# Patient Record
Sex: FEMALE | Race: WHITE | NOT HISPANIC OR LATINO | Employment: FULL TIME | URBAN - NONMETROPOLITAN AREA
[De-identification: names, ages, dates, MRNs, and addresses within clinical notes are randomized per-mention and may not be internally consistent; named-entity substitution may affect disease eponyms.]

---

## 2018-04-10 ENCOUNTER — OFFICE VISIT (OUTPATIENT)
Dept: MEDICAL GROUP | Facility: PHYSICIAN GROUP | Age: 58
End: 2018-04-10
Payer: COMMERCIAL

## 2018-04-10 VITALS
WEIGHT: 151 LBS | OXYGEN SATURATION: 97 % | HEIGHT: 68 IN | BODY MASS INDEX: 22.88 KG/M2 | DIASTOLIC BLOOD PRESSURE: 72 MMHG | TEMPERATURE: 98 F | HEART RATE: 84 BPM | RESPIRATION RATE: 16 BRPM | SYSTOLIC BLOOD PRESSURE: 126 MMHG

## 2018-04-10 DIAGNOSIS — R42 DIZZINESS: ICD-10-CM

## 2018-04-10 DIAGNOSIS — R51.9 INCREASED FREQUENCY OF HEADACHES: ICD-10-CM

## 2018-04-10 DIAGNOSIS — Z12.39 SCREENING FOR BREAST CANCER: ICD-10-CM

## 2018-04-10 DIAGNOSIS — Z13.6 SCREENING FOR CARDIOVASCULAR CONDITION: ICD-10-CM

## 2018-04-10 DIAGNOSIS — Z00.00 HEALTHCARE MAINTENANCE: ICD-10-CM

## 2018-04-10 PROCEDURE — 99204 OFFICE O/P NEW MOD 45 MIN: CPT | Performed by: NURSE PRACTITIONER

## 2018-04-10 RX ORDER — BIOTIN 1 MG
1000 TABLET ORAL DAILY
COMMUNITY

## 2018-04-10 RX ORDER — MECLIZINE HCL 12.5 MG/1
12.5 TABLET ORAL 3 TIMES DAILY PRN
Qty: 30 TAB | Refills: 0 | Status: SHIPPED | OUTPATIENT
Start: 2018-04-10 | End: 2018-12-18

## 2018-04-10 RX ORDER — CHLORAL HYDRATE 500 MG
1000 CAPSULE ORAL DAILY
COMMUNITY

## 2018-04-10 RX ORDER — NAPROXEN 500 MG/1
500 TABLET ORAL 2 TIMES DAILY WITH MEALS
Qty: 60 TAB | Refills: 1 | Status: SHIPPED | OUTPATIENT
Start: 2018-04-10 | End: 2018-12-18

## 2018-04-10 ASSESSMENT — PATIENT HEALTH QUESTIONNAIRE - PHQ9: CLINICAL INTERPRETATION OF PHQ2 SCORE: 0

## 2018-04-10 NOTE — PROGRESS NOTES
Chief Complaint   Patient presents with   • Headache     mood swings   • Loss Of Balance         This is a 58 y.o.female patient that presents today with the following: Establish care with new PCP, discuss acute and chronic conditions    Increased frequency of headaches  Pt has increased frequency of headaches over the past 3-4 weeks. She cannot think of any other associated symptoms. She does not have photophobia but does have occasional photophobia. She has not started any new medications. She does have allergies and wonders if it could possibly be worsening allergic rhinitis. She also reports to me that she has not had her eyes checked in over 3 years, recommended that she go in for an eye exam. Upon physical examination it is noted that she does have a discoloration and retraction of her left TM and erythematous and edematous nasal mucosa. She has been taking over-the-counter Advil with no significant improvement in her headaches. We'll have her try naproxen, 500 mg twice daily with food. I have advised her to use nasal saline 2 sprays to each nostril 3-5 times a day as needed for nasal congestion and rinsing and we'll have her add second-generation antihistamine such as Allegra, Zyrtec or Xyzal to her regimen. Symptoms do not improve we will look into further evaluation. I have asked her to keep a headache diary for the next few weeks.     Dizziness  This is chronic, for the last couple of year and seemed to have worsened over the last 6 months. It is pretty much constant now. It is especially around tall ceilings or buildings. She has been diagnosed with vertigo in the past due to her allergies but feels this is different. She does not have any other associated neurological symptoms with her dizziness. She does have a discolored and retracted left TM and does suffer from allergic rhinitis. She has been on meclizine in the past which does help with her dizziness, we will have her restart this  medication.    Healthcare maintenance  Patient is due for routine fasting labs, these have been ordered. She is also due for mammogram. She states that she is up-to-date with colon cancer screening, we will try to get her outside record and update her health maintenance record. She no longer needs Pap smears as she has had hysterectomy for noncancerous reasons.      No results found for any previous visit.         clinical course has been stable    No past medical history on file.    No past surgical history on file.    No family history on file.    Patient has no known allergies.    Current Outpatient Prescriptions Ordered in [facility]   Medication Sig Dispense Refill   • Omega-3 Fatty Acids (FISH OIL) 1000 MG Cap capsule Take 1,000 mg by mouth 3 times a day, with meals.     • Biotin 1000 MCG Tab Take  by mouth.     • vitamin D (CHOLECALCIFEROL) 1000 UNIT Tab Take 1,000 Units by mouth every day.     • Misc Natural Products (ESTROVEN ENERGY PO) Take  by mouth.     • Calcium-Magnesium-Zinc 167-83-8 MG Tab Take  by mouth.     • meclizine (ANTIVERT) 12.5 MG Tab Take 1 Tab by mouth 3 times a day as needed. 30 Tab 0   • naproxen (NAPROSYN) 500 MG Tab Take 1 Tab by mouth 2 times a day, with meals. 60 Tab 1     No current Epic-ordered facility-administered medications on file.        Constitutional ROS: No unexpected change in weight, No weakness, No unexplained fevers, sweats, or chills  Ear ROS: Positive per history of present illness  Pulmonary ROS: No chronic cough, sputum, or hemoptysis, No shortness of breath, No recent change in breathing  Cardiovascular ROS: No chest pain, No edema, No palpitations  Gastrointestinal ROS: No abdominal pain, No nausea, vomiting, diarrhea, or constipation  Musculoskeletal/Extremities ROS: No clubbing, No peripheral edema, No pain, redness or swelling on the joints  Neurologic ROS: Normal development, No seizures, No weakness, Positive for headaches and dizziness, per history of present  "illness    Physical exam:  /72   Pulse 84   Temp 36.7 °C (98 °F)   Resp 16   Ht 1.715 m (5' 7.5\")   Wt 68.5 kg (151 lb)   SpO2 97%   BMI 23.30 kg/m²   General Appearance: Middle-aged female, alert, no distress, well-nourished, well-groomed  Skin: Skin color, texture, turgor normal. No rashes or lesions.  Eyes: conjunctivae/corneas clear. PERRL, EOM's intact. Fundi benign  Ears: positive findings: R TM -normal, L TM - retracted and discolored  Lungs: negative findings: normal respiratory rate and rhythm, lungs clear to auscultation  Heart: negative. RRR without murmur, gallop, or rubs.  No ectopy.  Abdomen: Abdomen soft, non-tender. BS normal. No masses,  No organomegaly  Musculoskeletal: negative findings: ROM of all joints is normal, no evidence of joint instability, strength normal, no deformities present  Neurologic: intact, oriented, mood appropriate, judgment intact. Cranial nerves II through XII grossly intact    Medical decision making/discussion: Patient is going to start keeping a headache diary and will start naproxen 500 mg twice daily with meals. We'll have her start meclizine 12.5 mg one pill 2 times a day as needed for dizziness. She will have labs done before she follows up with me in 4-6 weeks. Her mammogram has been ordered.    Mariela was seen today for headache and loss of balance.    Diagnoses and all orders for this visit:    Increased frequency of headaches  -     naproxen (NAPROSYN) 500 MG Tab; Take 1 Tab by mouth 2 times a day, with meals.    Dizziness  -     meclizine (ANTIVERT) 12.5 MG Tab; Take 1 Tab by mouth 3 times a day as needed.    Healthcare maintenance    Screening for breast cancer  -     MA-SCREEN MAMMO W/CAD-BILAT; Future    Screening for cardiovascular condition  -     COMP METABOLIC PANEL; Future  -     LIPID PROFILE; Future          Please note that this dictation was created using voice recognition software. I have made every reasonable attempt to correct obvious " errors, but I expect that there are errors of grammar and possibly content that I did not discover before finalizing the note.

## 2018-04-10 NOTE — ASSESSMENT & PLAN NOTE
Pt has increased frequency of headaches over the past 3-4 weeks. She cannot think of any other associated symptoms. She does not have photophobia but does have occasional photophobia. She has not started any new medications. She does have allergies and wonders if it could possibly be worsening allergic rhinitis. She also reports to me that she has not had her eyes checked in over 3 years, recommended that she go in for an eye exam. Upon physical examination it is noted that she does have a discoloration and retraction of her left TM and erythematous and edematous nasal mucosa. She has been taking over-the-counter Advil with no significant improvement in her headaches. We'll have her try naproxen, 500 mg twice daily with food. I have advised her to use nasal saline 2 sprays to each nostril 3-5 times a day as needed for nasal congestion and rinsing and we'll have her add second-generation antihistamine such as Allegra, Zyrtec or Xyzal to her regimen. Symptoms do not improve we will look into further evaluation. I have asked her to keep a headache diary for the next few weeks.

## 2018-04-10 NOTE — ASSESSMENT & PLAN NOTE
This is chronic, for the last couple of year and seemed to have worsened over the last 6 months. It is pretty much constant now. It is especially around tall ceilings or buildings. She has been diagnosed with vertigo in the past due to her allergies but feels this is different. She does not have any other associated neurological symptoms with her dizziness. She does have a discolored and retracted left TM and does suffer from allergic rhinitis. She has been on meclizine in the past which does help with her dizziness, we will have her restart this medication.

## 2018-04-10 NOTE — ASSESSMENT & PLAN NOTE
Patient is due for routine fasting labs, these have been ordered. She is also due for mammogram. She states that she is up-to-date with colon cancer screening, we will try to get her outside record and update her health maintenance record. She no longer needs Pap smears as she has had hysterectomy for noncancerous reasons.

## 2018-04-10 NOTE — PATIENT INSTRUCTIONS
Use nasal saline, be generous, 2 sprays to each nostril 3-5 times per day. Add Allegra, Zyrtec or Xyzal OTC, ok to use the store brand    Meclizine 12.5 1-2 tabs up to 3 times daily for dizziness    Naproxen for headaches, twice daily with food, do not take advil at the same time    Follow up in 4-6 weeks with labs before visit    Mammogram

## 2018-04-16 ENCOUNTER — HOSPITAL ENCOUNTER (OUTPATIENT)
Dept: LAB | Facility: MEDICAL CENTER | Age: 58
End: 2018-04-16
Attending: NURSE PRACTITIONER
Payer: COMMERCIAL

## 2018-04-16 DIAGNOSIS — Z13.6 SCREENING FOR CARDIOVASCULAR CONDITION: ICD-10-CM

## 2018-04-16 LAB
ALBUMIN SERPL BCP-MCNC: 4.2 G/DL (ref 3.2–4.9)
ALBUMIN/GLOB SERPL: 1.6 G/DL
ALP SERPL-CCNC: 58 U/L (ref 30–99)
ALT SERPL-CCNC: 14 U/L (ref 2–50)
ANION GAP SERPL CALC-SCNC: 8 MMOL/L (ref 0–11.9)
AST SERPL-CCNC: 15 U/L (ref 12–45)
BILIRUB SERPL-MCNC: 0.9 MG/DL (ref 0.1–1.5)
BUN SERPL-MCNC: 13 MG/DL (ref 8–22)
CALCIUM SERPL-MCNC: 9.3 MG/DL (ref 8.5–10.5)
CHLORIDE SERPL-SCNC: 103 MMOL/L (ref 96–112)
CHOLEST SERPL-MCNC: 256 MG/DL (ref 100–199)
CO2 SERPL-SCNC: 29 MMOL/L (ref 20–33)
CREAT SERPL-MCNC: 0.83 MG/DL (ref 0.5–1.4)
GLOBULIN SER CALC-MCNC: 2.7 G/DL (ref 1.9–3.5)
GLUCOSE SERPL-MCNC: 86 MG/DL (ref 65–99)
HDLC SERPL-MCNC: 69 MG/DL
LDLC SERPL CALC-MCNC: 151 MG/DL
POTASSIUM SERPL-SCNC: 4.4 MMOL/L (ref 3.6–5.5)
PROT SERPL-MCNC: 6.9 G/DL (ref 6–8.2)
SODIUM SERPL-SCNC: 140 MMOL/L (ref 135–145)
TRIGL SERPL-MCNC: 178 MG/DL (ref 0–149)

## 2018-04-16 PROCEDURE — 80053 COMPREHEN METABOLIC PANEL: CPT

## 2018-04-16 PROCEDURE — 36415 COLL VENOUS BLD VENIPUNCTURE: CPT

## 2018-04-16 PROCEDURE — 80061 LIPID PANEL: CPT

## 2018-05-16 ENCOUNTER — OFFICE VISIT (OUTPATIENT)
Dept: MEDICAL GROUP | Facility: PHYSICIAN GROUP | Age: 58
End: 2018-05-16
Payer: COMMERCIAL

## 2018-05-16 VITALS
DIASTOLIC BLOOD PRESSURE: 70 MMHG | HEART RATE: 80 BPM | RESPIRATION RATE: 16 BRPM | OXYGEN SATURATION: 96 % | WEIGHT: 148 LBS | HEIGHT: 68 IN | BODY MASS INDEX: 22.43 KG/M2 | SYSTOLIC BLOOD PRESSURE: 130 MMHG | TEMPERATURE: 97.6 F

## 2018-05-16 DIAGNOSIS — R68.82 LOW LIBIDO: ICD-10-CM

## 2018-05-16 DIAGNOSIS — R51.9 INCREASED FREQUENCY OF HEADACHES: ICD-10-CM

## 2018-05-16 DIAGNOSIS — E78.2 MIXED HYPERLIPIDEMIA: ICD-10-CM

## 2018-05-16 DIAGNOSIS — N95.1 HOT FLASHES DUE TO MENOPAUSE: ICD-10-CM

## 2018-05-16 DIAGNOSIS — R42 DIZZINESS: ICD-10-CM

## 2018-05-16 PROCEDURE — 99214 OFFICE O/P EST MOD 30 MIN: CPT | Performed by: NURSE PRACTITIONER

## 2018-05-16 RX ORDER — ESTRADIOL 1 MG/1
1 TABLET ORAL DAILY
Qty: 90 TAB | Refills: 1 | Status: SHIPPED | OUTPATIENT
Start: 2018-05-16 | End: 2018-12-18 | Stop reason: SDUPTHER

## 2018-05-16 NOTE — PATIENT INSTRUCTIONS
Can try cutting the Naproxen in half    Can try switch the Zyrtec to Allegra or Claritin    Estrace 1 mg daily    Follow up with me in 6 months, sooner if needed    Schedule mammogram    Fat and Cholesterol Restricted Diet  High levels of fat and cholesterol in your blood may lead to various health problems, such as diseases of the heart, blood vessels, gallbladder, liver, and pancreas. Fats are concentrated sources of energy that come in various forms. Certain types of fat, including saturated fat, may be harmful in excess. Cholesterol is a substance needed by your body in small amounts. Your body makes all the cholesterol it needs. Excess cholesterol comes from the food you eat.  When you have high levels of cholesterol and saturated fat in your blood, health problems can develop because the excess fat and cholesterol will gather along the walls of your blood vessels, causing them to narrow. Choosing the right foods will help you control your intake of fat and cholesterol. This will help keep the levels of these substances in your blood within normal limits and reduce your risk of disease.  WHAT IS MY PLAN?  Your health care provider recommends that you:  · Get no more than __________ % of the total calories in your daily diet from fat.  · Limit your intake of saturated fat to less than ______% of your total calories each day.  · Limit the amount of cholesterol in your diet to less than _________mg per day.  WHAT TYPES OF FAT SHOULD I CHOOSE?  · Choose healthy fats more often. Choose monounsaturated and polyunsaturated fats, such as olive and canola oil, flaxseeds, walnuts, almonds, and seeds.  · Eat more omega-3 fats. Good choices include salmon, mackerel, sardines, tuna, flaxseed oil, and ground flaxseeds. Aim to eat fish at least two times a week.  · Limit saturated fats. Saturated fats are primarily found in animal products, such as meats, butter, and cream. Plant sources of saturated fats include palm oil,  "palm kernel oil, and coconut oil.  · Avoid foods with partially hydrogenated oils in them. These contain trans fats. Examples of foods that contain trans fats are stick margarine, some tub margarines, cookies, crackers, and other baked goods.  WHAT GENERAL GUIDELINES DO I NEED TO FOLLOW?  These guidelines for healthy eating will help you control your intake of fat and cholesterol:  · Check food labels carefully to identify foods with trans fats or high amounts of saturated fat.  · Fill one half of your plate with vegetables and green salads.  · Fill one fourth of your plate with whole grains. Look for the word \"whole\" as the first word in the ingredient list.  · Fill one fourth of your plate with lean protein foods.  · Limit fruit to two servings a day. Choose fruit instead of juice.  · Eat more foods that contain soluble fiber. Examples of foods that contain this type of fiber are apples, broccoli, carrots, beans, peas, and barley. Aim to get 20-30 g of fiber per day.  · Eat more home-cooked food and less restaurant, buffet, and fast food.  · Limit or avoid alcohol.  · Limit foods high in starch and sugar.  · Limit fried foods.  · Cook foods using methods other than frying. Baking, boiling, grilling, and broiling are all great options.  · Lose weight if you are overweight. Losing just 5-10% of your initial body weight can help your overall health and prevent diseases such as diabetes and heart disease.  WHAT FOODS CAN I EAT?  Grains  Whole grains, such as whole wheat or whole grain breads, crackers, cereals, and pasta. Unsweetened oatmeal, bulgur, barley, quinoa, or brown rice. Corn or whole wheat flour tortillas.  Vegetables  Fresh or frozen vegetables (raw, steamed, roasted, or grilled). Green salads.  Fruits  All fresh, canned (in natural juice), or frozen fruits.  Meat and Other Protein Products  Ground beef (85% or leaner), grass-fed beef, or beef trimmed of fat. Skinless chicken or turkey. Ground chicken or " turkey. Pork trimmed of fat. All fish and seafood. Eggs. Dried beans, peas, or lentils. Unsalted nuts or seeds. Unsalted canned or dry beans.  Dairy  Low-fat dairy products, such as skim or 1% milk, 2% or reduced-fat cheeses, low-fat ricotta or cottage cheese, or plain low-fat yogurt.  Fats and Oils  Tub margarines without trans fats. Light or reduced-fat mayonnaise and salad dressings. Avocado. Olive, canola, sesame, or safflower oils. Natural peanut or almond butter (choose ones without added sugar and oil).  The items listed above may not be a complete list of recommended foods or beverages. Contact your dietitian for more options.  WHAT FOODS ARE NOT RECOMMENDED?  Grains  White bread. White pasta. White rice. Cornbread. Bagels, pastries, and croissants. Crackers that contain trans fat.  Vegetables  White potatoes. Corn. Creamed or fried vegetables. Vegetables in a cheese sauce.  Fruits  Dried fruits. Canned fruit in light or heavy syrup. Fruit juice.  Meat and Other Protein Products  Fatty cuts of meat. Ribs, chicken wings, jimenes, sausage, bologna, salami, chitterlings, fatback, hot dogs, bratwurst, and packaged luncheon meats. Liver and organ meats.  Dairy  Whole or 2% milk, cream, half-and-half, and cream cheese. Whole milk cheeses. Whole-fat or sweetened yogurt. Full-fat cheeses. Nondairy creamers and whipped toppings. Processed cheese, cheese spreads, or cheese curds.  Sweets and Desserts  Corn syrup, sugars, honey, and molasses. Candy. Jam and jelly. Syrup. Sweetened cereals. Cookies, pies, cakes, donuts, muffins, and ice cream.  Fats and Oils  Butter, stick margarine, lard, shortening, ghee, or jimenes fat. Coconut, palm kernel, or palm oils.  Beverages  Alcohol. Sweetened drinks (such as sodas, lemonade, and fruit drinks or punches).  The items listed above may not be a complete list of foods and beverages to avoid. Contact your dietitian for more information.     This information is not intended to replace  advice given to you by your health care provider. Make sure you discuss any questions you have with your health care provider.     Document Released: 12/18/2006 Document Revised: 01/08/2016 Document Reviewed: 03/18/2015  Elsevier Interactive Patient Education ©2016 [employer]

## 2018-05-17 NOTE — PROGRESS NOTES
Chief Complaint   Patient presents with   • Dizziness     fv labs         This is a 58 y.o.female patient that presents today with the following: Follow-up, review labs    Increased frequency of headaches  Patient here for follow-up on increased frequency of headaches over the past 4-5 months.  She was started on naproxen, while this helped her headache it did cause sleepiness for her.  She reports being very sensitive to all medications.  She will try breaking this in half and take only as needed and at bedtime.  She also reports the allergy treatment helped her headaches as well.    Dizziness  Patient reports that this has improved since her last visit especially with starting allergy medication, however the Zyrtec did make her sleepy.  We will have her switch to 1 of the other second generation antihistamines such as Allegra or Claritin.  She can also continue to take these at night.    Mixed hyperlipidemia  This is a chronic condition, stable.  Her lipid profile is as follows:  Component      Latest Ref Rng & Units 4/16/2018           9:04 AM   Cholesterol,Tot      100 - 199 mg/dL 256 (H)   Triglycerides      0 - 149 mg/dL 178 (H)   HDL      >=40 mg/dL 69   LDL      <100 mg/dL 151 (H)   Her calculated 10 year risk of having cardiovascular event in the next 10 years is 2.9%.  According to current guidelines per the AHA/ACC she is not a candidate for aspirin or statin therapy, but I did recommend that she follow a healthy low-fat, low-cholesterol diet, exercise regularly continue to maintain her healthy weight.  She was given printed educational material on low-fat, low-cholesterol diet.  We will recheck this again in 6 months.    Low libido  Patient reports to suffering from low libido/sexual drive as well as hot flashes and other symptoms of menopause for the last several months.  5 years ago this was not a problem.  She struggles with this as she is newly .  She is wanting to know if there is anything  that she can take for this.  She does not feel that she needs any type of psychotherapy as she is very happy in her marriage, they get along well, she has no body image concerns.  I did discuss with her that since she is having hot flashes and other symptoms of menopause, we can try her on hormone replacement such as estrogen.  I did discuss with her the risks, benefits and side effects of this medication.  She does understand that she has to have a mammogram every year, she will schedule this.  I did discuss with her the other options for treating low libido and low sexual drive his testosterone, she is not interested in this at this time.  she would like to continue with the estrogen therapy.      No visits with results within 1 Month(s) from this visit.   Latest known visit with results is:   Hospital Outpatient Visit on 04/16/2018   Component Date Value   • Sodium 04/16/2018 140    • Potassium 04/16/2018 4.4    • Chloride 04/16/2018 103    • Co2 04/16/2018 29    • Anion Gap 04/16/2018 8.0    • Glucose 04/16/2018 86    • Bun 04/16/2018 13    • Creatinine 04/16/2018 0.83    • Calcium 04/16/2018 9.3    • AST(SGOT) 04/16/2018 15    • ALT(SGPT) 04/16/2018 14    • Alkaline Phosphatase 04/16/2018 58    • Total Bilirubin 04/16/2018 0.9    • Albumin 04/16/2018 4.2    • Total Protein 04/16/2018 6.9    • Globulin 04/16/2018 2.7    • A-G Ratio 04/16/2018 1.6    • Cholesterol,Tot 04/16/2018 256*   • Triglycerides 04/16/2018 178*   • HDL 04/16/2018 69    • LDL 04/16/2018 151*   • GFR If  04/16/2018 >60    • GFR If Non  Ameri* 04/16/2018 >60          clinical course has been stable    No past medical history on file.    No past surgical history on file.    No family history on file.    Patient has no known allergies.    Current Outpatient Prescriptions Ordered in [facility]   Medication Sig Dispense Refill   • estradiol (ESTRACE) 1 MG Tab Take 1 Tab by mouth every day. 90 Tab 1   • Omega-3 Fatty Acids (FISH  "OIL) 1000 MG Cap capsule Take 1,000 mg by mouth 3 times a day, with meals.     • Biotin 1000 MCG Tab Take  by mouth.     • vitamin D (CHOLECALCIFEROL) 1000 UNIT Tab Take 1,000 Units by mouth every day.     • Misc Natural Products (ESTROVEN ENERGY PO) Take  by mouth.     • Calcium-Magnesium-Zinc 167-83-8 MG Tab Take  by mouth.     • meclizine (ANTIVERT) 12.5 MG Tab Take 1 Tab by mouth 3 times a day as needed. 30 Tab 0   • naproxen (NAPROSYN) 500 MG Tab Take 1 Tab by mouth 2 times a day, with meals. 60 Tab 1     No current Epic-ordered facility-administered medications on file.        Constitutional ROS: No unexpected change in weight, No weakness, No unexplained fevers, sweats, or chills  Pulmonary ROS: No chronic cough, sputum, or hemoptysis, No shortness of breath, No recent change in breathing  Cardiovascular ROS: No chest pain, No edema, No palpitations  Musculoskeletal/Extremities ROS: No clubbing, No peripheral edema, No pain, redness or swelling on the joints  Neurologic ROS: Normal development, No seizures, No weakness positive for headaches, improved  Genitourinary ROS: Positive per HPI    Physical exam:  /70   Pulse 80   Temp 36.4 °C (97.6 °F)   Resp 16   Ht 1.715 m (5' 7.5\")   Wt 67.1 kg (148 lb)   SpO2 96%   BMI 22.84 kg/m²   General Appearance: Middle-aged female, alert, no distress, well-nourished, well-groomed  Skin: Skin color, texture, turgor normal. No rashes or lesions.  Lungs: negative findings: normal respiratory rate and rhythm, lungs clear to auscultation  Heart: negative. RRR without murmur, gallop, or rubs.  No ectopy.  Abdomen: Abdomen soft, non-tender. BS normal. No masses,  No organomegaly  Musculoskeletal: negative findings: ROM of all joints is normal, strength normal, no deformities present  Neurologic: intact    Medical decision making/discussion: Will have patient start Estrace, 1 mg daily for symptoms of menopause as well as low libido.  She will work on Replise" low-cholesterol diet, regular physical activity and continued efforts towards maintaining her healthy weight.  She can continue on naproxen for headaches as needed.  She can switch from Zyrtec to 1 of the other second generation antihistamines.  She will follow-up with me in 6 months with labs done before visit.  She was advised to schedule her mammogram.    Mariela was seen today for dizziness.    Diagnoses and all orders for this visit:    Increased frequency of headaches    Dizziness    Low libido  -     estradiol (ESTRACE) 1 MG Tab; Take 1 Tab by mouth every day.    Hot flashes due to menopause  -     estradiol (ESTRACE) 1 MG Tab; Take 1 Tab by mouth every day.    Mixed hyperlipidemia  -     LIPID PROFILE; Future          Please note that this dictation was created using voice recognition software. I have made every reasonable attempt to correct obvious errors, but I expect that there are errors of grammar and possibly content that I did not discover before finalizing the note.

## 2018-05-17 NOTE — ASSESSMENT & PLAN NOTE
Patient reports to suffering from low libido/sexual drive as well as hot flashes and other symptoms of menopause for the last several months.  5 years ago this was not a problem.  She struggles with this as she is newly .  She is wanting to know if there is anything that she can take for this.  She does not feel that she needs any type of psychotherapy as she is very happy in her marriage, they get along well, she has no body image concerns.  I did discuss with her that since she is having hot flashes and other symptoms of menopause, we can try her on hormone replacement such as estrogen.  I did discuss with her the risks, benefits and side effects of this medication.  She does understand that she has to have a mammogram every year, she will schedule this.  I did discuss with her the other options for treating low libido and low sexual drive his testosterone, she is not interested in this at this time.  she would like to continue with the estrogen therapy.

## 2018-05-17 NOTE — ASSESSMENT & PLAN NOTE
Patient reports that this has improved since her last visit especially with starting allergy medication, however the Zyrtec did make her sleepy.  We will have her switch to 1 of the other second generation antihistamines such as Allegra or Claritin.  She can also continue to take these at night.

## 2018-05-17 NOTE — ASSESSMENT & PLAN NOTE
Patient here for follow-up on increased frequency of headaches over the past 4-5 months.  She was started on naproxen, while this helped her headache it did cause sleepiness for her.  She reports being very sensitive to all medications.  She will try breaking this in half and take only as needed and at bedtime.  She also reports the allergy treatment helped her headaches as well.

## 2018-05-17 NOTE — ASSESSMENT & PLAN NOTE
This is a chronic condition, stable.  Her lipid profile is as follows:  Component      Latest Ref Rng & Units 4/16/2018           9:04 AM   Cholesterol,Tot      100 - 199 mg/dL 256 (H)   Triglycerides      0 - 149 mg/dL 178 (H)   HDL      >=40 mg/dL 69   LDL      <100 mg/dL 151 (H)   Her calculated 10 year risk of having cardiovascular event in the next 10 years is 2.9%.  According to current guidelines per the AHA/ACC she is not a candidate for aspirin or statin therapy, but I did recommend that she follow a healthy low-fat, low-cholesterol diet, exercise regularly continue to maintain her healthy weight.  She was given printed educational material on low-fat, low-cholesterol diet.  We will recheck this again in 6 months.

## 2018-11-19 ENCOUNTER — OFFICE VISIT (OUTPATIENT)
Dept: MEDICAL GROUP | Facility: PHYSICIAN GROUP | Age: 58
End: 2018-11-19
Payer: COMMERCIAL

## 2018-11-19 VITALS
HEART RATE: 82 BPM | WEIGHT: 157 LBS | HEIGHT: 67 IN | RESPIRATION RATE: 16 BRPM | BODY MASS INDEX: 24.64 KG/M2 | DIASTOLIC BLOOD PRESSURE: 70 MMHG | TEMPERATURE: 97.8 F | SYSTOLIC BLOOD PRESSURE: 132 MMHG | OXYGEN SATURATION: 96 %

## 2018-11-19 DIAGNOSIS — R42 DIZZINESS: ICD-10-CM

## 2018-11-19 DIAGNOSIS — N95.1 HOT FLASHES DUE TO MENOPAUSE: ICD-10-CM

## 2018-11-19 DIAGNOSIS — F41.9 ANXIETY: ICD-10-CM

## 2018-11-19 DIAGNOSIS — E78.2 MIXED HYPERLIPIDEMIA: ICD-10-CM

## 2018-11-19 PROCEDURE — 99214 OFFICE O/P EST MOD 30 MIN: CPT | Performed by: NURSE PRACTITIONER

## 2018-11-19 RX ORDER — LORAZEPAM 0.5 MG/1
0.5 TABLET ORAL 2 TIMES DAILY PRN
Qty: 30 TAB | Refills: 0 | Status: SHIPPED | OUTPATIENT
Start: 2018-11-19 | End: 2020-08-04 | Stop reason: SDUPTHER

## 2018-11-19 NOTE — PROGRESS NOTES
Chief Complaint   Patient presents with   • Headache     fv         This is a 58 y.o.female patient that presents today with the following: Follow-up visit    Dizziness  Patient continues to struggle with vertigo, she was last seen in May 2018 and started on allergy medications, but she could not tolerate them because of adverse effects.  She notes that she has significant dizziness when she tilts her head upward as well as when she is in the car and other cars/traffic is in her peripheral vision.  She has been seen by optometry, she has significant difference in vision in each eye which she attributes most of her dizziness 2.  She was advised to either follow-up with neurology or ENT to find any other cause of her vertigo, referral to ENT has been placed.    Hot flashes due to menopause  This is chronic, stable and very well controlled with Estrace 1 mg daily.  She tolerates this well with no significant bothersome side effects.    Mixed hyperlipidemia  The 10-year ASCVD risk score (Pingnoel CAMEJO Jr., et al., 2013) is: 3%    Values used to calculate the score:      Age: 58 years      Sex: Female      Is Non- : No      Diabetic: No      Tobacco smoker: No      Systolic Blood Pressure: 132 mmHg      Is BP treated: No      HDL Cholesterol: 69 mg/dL      Total Cholesterol: 256 mg/dL  This is chronic, stable and well-controlled with lifestyle modifications.    Anxiety  This is a chronic condition that has slowly worsened over the past several months to a year especially associated with her dizziness.  She is now becoming very anxious when she has to drive any long distance by herself.  She does have family coming in and will have to  a family member at the airport and becomes very tearful at the thought of having to drive. She was offered prescription for atenolol but she declines as she has been on this before and she has significant adverse effects.  She has been on lorazepam in the past which  has helped.   She states she has uses very sparingly and is requesting a prescription for this to see if it will help with her anxiety.  This is reasonable, she was given a 30 pill prescription and she is to use only as needed.  She was advised to also try taking a half of a pill and see how it affects her.      No visits with results within 1 Month(s) from this visit.   Latest known visit with results is:   Hospital Outpatient Visit on 04/16/2018   Component Date Value   • Sodium 04/16/2018 140    • Potassium 04/16/2018 4.4    • Chloride 04/16/2018 103    • Co2 04/16/2018 29    • Anion Gap 04/16/2018 8.0    • Glucose 04/16/2018 86    • Bun 04/16/2018 13    • Creatinine 04/16/2018 0.83    • Calcium 04/16/2018 9.3    • AST(SGOT) 04/16/2018 15    • ALT(SGPT) 04/16/2018 14    • Alkaline Phosphatase 04/16/2018 58    • Total Bilirubin 04/16/2018 0.9    • Albumin 04/16/2018 4.2    • Total Protein 04/16/2018 6.9    • Globulin 04/16/2018 2.7    • A-G Ratio 04/16/2018 1.6    • Cholesterol,Tot 04/16/2018 256*   • Triglycerides 04/16/2018 178*   • HDL 04/16/2018 69    • LDL 04/16/2018 151*   • GFR If  04/16/2018 >60    • GFR If Non  Ameri* 04/16/2018 >60          clinical course has been stable    Past Medical History:   Diagnosis Date   • Menopause    • Vertigo        History reviewed. No pertinent surgical history.    History reviewed. No pertinent family history.    Patient has no known allergies.    Current Outpatient Prescriptions Ordered in [facility]   Medication Sig Dispense Refill   • LORazepam (ATIVAN) 0.5 MG Tab Take 1 Tab by mouth 2 times a day as needed for Anxiety for up to 30 days. 30 Tab 0   • estradiol (ESTRACE) 1 MG Tab Take 1 Tab by mouth every day. 90 Tab 1   • Omega-3 Fatty Acids (FISH OIL) 1000 MG Cap capsule Take 1,000 mg by mouth 3 times a day, with meals.     • Biotin 1000 MCG Tab Take  by mouth.     • vitamin D (CHOLECALCIFEROL) 1000 UNIT Tab Take 1,000 Units by mouth every day.     •  "Calcium-Magnesium-Zinc 167-83-8 MG Tab Take  by mouth.     • Misc Natural Products (ESTROVEN ENERGY PO) Take  by mouth.     • meclizine (ANTIVERT) 12.5 MG Tab Take 1 Tab by mouth 3 times a day as needed. 30 Tab 0   • naproxen (NAPROSYN) 500 MG Tab Take 1 Tab by mouth 2 times a day, with meals. 60 Tab 1     No current Epic-ordered facility-administered medications on file.        Constitutional ROS: No unexpected change in weight, No weakness, No unexplained fevers, sweats, or chills  Ear ROS: Positive for vertigo   Pulmonary ROS: No chronic cough, sputum, or hemoptysis, No shortness of breath, No recent change in breathing  Cardiovascular ROS: No chest pain, No edema, No palpitations positive for hyperlipidemia  Musculoskeletal/Extremities ROS: No clubbing, No peripheral edema, No pain, redness or swelling on the joints  Neurologic ROS: Normal development, No seizures, No weakness  Psychiatric ROS: Positive for anxiety    ROS: Positive per HPI    Physical exam:  /70 (BP Location: Right arm, Patient Position: Sitting, BP Cuff Size: Adult)   Pulse 82   Temp 36.6 °C (97.8 °F) (Temporal)   Resp 16   Ht 1.702 m (5' 7\")   Wt 71.2 kg (157 lb)   SpO2 96%   BMI 24.59 kg/m²   General Appearance: Middle-aged female, alert, no distress, well-nourished, well-groomed  Skin: Skin color, texture, turgor normal. No rashes or lesions.  Lungs: negative findings: normal respiratory rate and rhythm, normal effort  Musculoskeletal: negative findings: no evidence of joint instability, strength normal, no deformities present  Neurologic: intact, CN 2-12 grossly intact    Medical decision making/discussion: We will refer patient to ENT for further evaluation of persistent vertigo, she was advised to have her labs done before she follows up with me in 6-8 weeks.   We will giv her a short course of lorazepam 0.5 mg 1 pill 1-2 times daily for situational anxiety.    Mariela was seen today for headache.    Diagnoses and all " orders for this visit:    Mixed hyperlipidemia    Hot flashes due to menopause    Dizziness  -     REFERRAL TO ENT    Anxiety  -     LORazepam (ATIVAN) 0.5 MG Tab; Take 1 Tab by mouth 2 times a day as needed for Anxiety for up to 30 days.          Please note that this dictation was created using voice recognition software. I have made every reasonable attempt to correct obvious errors, but I expect that there are errors of grammar and possibly content that I did not discover before finalizing the note.

## 2018-11-19 NOTE — PATIENT INSTRUCTIONS
Repeat lipid profile in the next couple of days    Referral to ENT    Ativan called in    See me in 6-8 weeks

## 2018-11-20 PROBLEM — F41.9 ANXIETY: Status: ACTIVE | Noted: 2018-11-20

## 2018-11-20 NOTE — ASSESSMENT & PLAN NOTE
The 10-year ASCVD risk score (Ping CAMEJO Jr., et al., 2013) is: 3%    Values used to calculate the score:      Age: 58 years      Sex: Female      Is Non- : No      Diabetic: No      Tobacco smoker: No      Systolic Blood Pressure: 132 mmHg      Is BP treated: No      HDL Cholesterol: 69 mg/dL      Total Cholesterol: 256 mg/dL  This is chronic, stable and well-controlled with lifestyle modifications.

## 2018-11-20 NOTE — ASSESSMENT & PLAN NOTE
Patient continues to struggle with vertigo, she was last seen in May 2018 and started on allergy medications, but she could not tolerate them because of adverse effects.  She notes that she has significant dizziness when she tilts her head upward as well as when she is in the car and other cars/traffic is in her peripheral vision.  She has been seen by optometry, she has significant difference in vision in each eye which she attributes most of her dizziness 2.  She was advised to either follow-up with neurology or ENT to find any other cause of her vertigo, referral to ENT has been placed.

## 2018-11-20 NOTE — ASSESSMENT & PLAN NOTE
This is a chronic condition that has slowly worsened over the past several months to a year especially associated with her dizziness.  She is now becoming very anxious when she has to drive any long distance by herself.  She does have family coming in and will have to  a family member at the airport and becomes very tearful at the thought of having to drive. She was offered prescription for atenolol but she declines as she has been on this before and she has significant adverse effects.  She has been on lorazepam in the past which has helped.   She states she has uses very sparingly and is requesting a prescription for this to see if it will help with her anxiety.  This is reasonable, she was given a 30 pill prescription and she is to use only as needed.  She was advised to also try taking a half of a pill and see how it affects her.

## 2018-11-20 NOTE — ASSESSMENT & PLAN NOTE
This is chronic, stable and very well controlled with Estrace 1 mg daily.  She tolerates this well with no significant bothersome side effects.

## 2018-12-11 ENCOUNTER — HOSPITAL ENCOUNTER (OUTPATIENT)
Dept: LAB | Facility: MEDICAL CENTER | Age: 58
End: 2018-12-11
Attending: NURSE PRACTITIONER
Payer: COMMERCIAL

## 2018-12-11 DIAGNOSIS — E78.2 MIXED HYPERLIPIDEMIA: ICD-10-CM

## 2018-12-11 PROCEDURE — 36415 COLL VENOUS BLD VENIPUNCTURE: CPT

## 2018-12-11 PROCEDURE — 80061 LIPID PANEL: CPT

## 2018-12-12 LAB
CHOLEST SERPL-MCNC: 254 MG/DL (ref 100–199)
FASTING STATUS PATIENT QL REPORTED: NORMAL
HDLC SERPL-MCNC: 68 MG/DL
LDLC SERPL CALC-MCNC: 151 MG/DL
TRIGL SERPL-MCNC: 173 MG/DL (ref 0–149)

## 2018-12-14 ENCOUNTER — TELEMEDICINE ORIGINATING SITE VISIT (OUTPATIENT)
Dept: MEDICAL GROUP | Facility: CLINIC | Age: 58
End: 2018-12-14
Payer: COMMERCIAL

## 2018-12-14 ENCOUNTER — APPOINTMENT (OUTPATIENT)
Dept: SCHEDULING | Facility: IMAGING CENTER | Age: 58
End: 2018-12-14
Payer: COMMERCIAL

## 2018-12-18 ENCOUNTER — APPOINTMENT (OUTPATIENT)
Dept: RADIOLOGY | Facility: MEDICAL CENTER | Age: 58
End: 2018-12-18
Attending: EMERGENCY MEDICINE
Payer: COMMERCIAL

## 2018-12-18 ENCOUNTER — HOSPITAL ENCOUNTER (OUTPATIENT)
Facility: MEDICAL CENTER | Age: 58
End: 2018-12-19
Attending: EMERGENCY MEDICINE | Admitting: HOSPITALIST
Payer: COMMERCIAL

## 2018-12-18 DIAGNOSIS — R68.82 LOW LIBIDO: ICD-10-CM

## 2018-12-18 DIAGNOSIS — R33.9 URINARY RETENTION: ICD-10-CM

## 2018-12-18 DIAGNOSIS — N95.1 HOT FLASHES DUE TO MENOPAUSE: ICD-10-CM

## 2018-12-18 DIAGNOSIS — M54.50 LUMBAR BACK PAIN: ICD-10-CM

## 2018-12-18 PROBLEM — M54.9 BACK PAIN: Status: ACTIVE | Noted: 2018-12-18

## 2018-12-18 PROBLEM — R03.0 ELEVATED BLOOD PRESSURE READING: Status: ACTIVE | Noted: 2018-12-18

## 2018-12-18 PROCEDURE — 700102 HCHG RX REV CODE 250 W/ 637 OVERRIDE(OP): Performed by: EMERGENCY MEDICINE

## 2018-12-18 PROCEDURE — 700111 HCHG RX REV CODE 636 W/ 250 OVERRIDE (IP): Performed by: EMERGENCY MEDICINE

## 2018-12-18 PROCEDURE — 99219 PR INITIAL OBSERVATION CARE,LEVL II: CPT | Performed by: HOSPITALIST

## 2018-12-18 PROCEDURE — G0378 HOSPITAL OBSERVATION PER HR: HCPCS

## 2018-12-18 PROCEDURE — 96375 TX/PRO/DX INJ NEW DRUG ADDON: CPT

## 2018-12-18 PROCEDURE — A9270 NON-COVERED ITEM OR SERVICE: HCPCS | Performed by: EMERGENCY MEDICINE

## 2018-12-18 PROCEDURE — 72148 MRI LUMBAR SPINE W/O DYE: CPT

## 2018-12-18 PROCEDURE — 700102 HCHG RX REV CODE 250 W/ 637 OVERRIDE(OP): Performed by: HOSPITALIST

## 2018-12-18 PROCEDURE — 99285 EMERGENCY DEPT VISIT HI MDM: CPT

## 2018-12-18 PROCEDURE — A9270 NON-COVERED ITEM OR SERVICE: HCPCS | Performed by: HOSPITALIST

## 2018-12-18 RX ORDER — ACETAMINOPHEN 325 MG/1
650 TABLET ORAL EVERY 6 HOURS PRN
Status: DISCONTINUED | OUTPATIENT
Start: 2018-12-18 | End: 2018-12-19 | Stop reason: HOSPADM

## 2018-12-18 RX ORDER — DIAZEPAM 5 MG/1
5 TABLET ORAL ONCE
Status: COMPLETED | OUTPATIENT
Start: 2018-12-18 | End: 2018-12-18

## 2018-12-18 RX ORDER — FLUTICASONE PROPIONATE 50 MCG
1 SPRAY, SUSPENSION (ML) NASAL DAILY
COMMUNITY
End: 2019-06-07

## 2018-12-18 RX ORDER — CYCLOBENZAPRINE HCL 10 MG
10 TABLET ORAL 3 TIMES DAILY PRN
Qty: 30 TAB | Refills: 0 | Status: CANCELLED | OUTPATIENT
Start: 2018-12-18

## 2018-12-18 RX ORDER — HYDROCODONE BITARTRATE AND ACETAMINOPHEN 5; 325 MG/1; MG/1
1 TABLET ORAL EVERY 6 HOURS PRN
Qty: 15 TAB | Refills: 0 | Status: CANCELLED | OUTPATIENT
Start: 2018-12-18 | End: 2018-12-21

## 2018-12-18 RX ORDER — CYCLOBENZAPRINE HCL 10 MG
10 TABLET ORAL ONCE
Status: ON HOLD | COMMUNITY
Start: 2018-12-18 | End: 2018-12-19

## 2018-12-18 RX ORDER — PROMETHAZINE HYDROCHLORIDE 25 MG/1
12.5-25 TABLET ORAL EVERY 4 HOURS PRN
Status: DISCONTINUED | OUTPATIENT
Start: 2018-12-18 | End: 2018-12-19 | Stop reason: HOSPADM

## 2018-12-18 RX ORDER — BISACODYL 10 MG
10 SUPPOSITORY, RECTAL RECTAL
Status: DISCONTINUED | OUTPATIENT
Start: 2018-12-18 | End: 2018-12-19 | Stop reason: HOSPADM

## 2018-12-18 RX ORDER — MORPHINE SULFATE 10 MG/ML
2 INJECTION, SOLUTION INTRAMUSCULAR; INTRAVENOUS
Status: DISCONTINUED | OUTPATIENT
Start: 2018-12-18 | End: 2018-12-19 | Stop reason: HOSPADM

## 2018-12-18 RX ORDER — MECLIZINE HCL 12.5 MG/1
12.5 TABLET ORAL 3 TIMES DAILY PRN
Status: DISCONTINUED | OUTPATIENT
Start: 2018-12-18 | End: 2018-12-19 | Stop reason: HOSPADM

## 2018-12-18 RX ORDER — ONDANSETRON 2 MG/ML
4 INJECTION INTRAMUSCULAR; INTRAVENOUS EVERY 4 HOURS PRN
Status: DISCONTINUED | OUTPATIENT
Start: 2018-12-18 | End: 2018-12-19 | Stop reason: HOSPADM

## 2018-12-18 RX ORDER — IBUPROFEN 200 MG
200 TABLET ORAL EVERY 6 HOURS PRN
Status: ON HOLD | COMMUNITY
End: 2018-12-19

## 2018-12-18 RX ORDER — MORPHINE SULFATE 10 MG/ML
4 INJECTION, SOLUTION INTRAMUSCULAR; INTRAVENOUS ONCE
Status: COMPLETED | OUTPATIENT
Start: 2018-12-18 | End: 2018-12-18

## 2018-12-18 RX ORDER — OXYCODONE HYDROCHLORIDE 5 MG/1
5 TABLET ORAL
Status: DISCONTINUED | OUTPATIENT
Start: 2018-12-18 | End: 2018-12-19 | Stop reason: HOSPADM

## 2018-12-18 RX ORDER — HYDROCODONE BITARTRATE AND ACETAMINOPHEN 5; 325 MG/1; MG/1
1 TABLET ORAL ONCE
Status: ON HOLD | COMMUNITY
Start: 2018-12-18 | End: 2018-12-19

## 2018-12-18 RX ORDER — KETOROLAC TROMETHAMINE 30 MG/ML
30 INJECTION, SOLUTION INTRAMUSCULAR; INTRAVENOUS ONCE
Status: COMPLETED | OUTPATIENT
Start: 2018-12-18 | End: 2018-12-18

## 2018-12-18 RX ORDER — HYDRALAZINE HYDROCHLORIDE 20 MG/ML
10 INJECTION INTRAMUSCULAR; INTRAVENOUS EVERY 4 HOURS PRN
Status: DISCONTINUED | OUTPATIENT
Start: 2018-12-18 | End: 2018-12-19 | Stop reason: HOSPADM

## 2018-12-18 RX ORDER — POLYETHYLENE GLYCOL 3350 17 G/17G
1 POWDER, FOR SOLUTION ORAL
Status: DISCONTINUED | OUTPATIENT
Start: 2018-12-18 | End: 2018-12-19 | Stop reason: HOSPADM

## 2018-12-18 RX ORDER — PROMETHAZINE HYDROCHLORIDE 25 MG/1
12.5-25 SUPPOSITORY RECTAL EVERY 4 HOURS PRN
Status: DISCONTINUED | OUTPATIENT
Start: 2018-12-18 | End: 2018-12-19 | Stop reason: HOSPADM

## 2018-12-18 RX ORDER — OXYCODONE HYDROCHLORIDE 5 MG/1
2.5 TABLET ORAL
Status: DISCONTINUED | OUTPATIENT
Start: 2018-12-18 | End: 2018-12-19 | Stop reason: HOSPADM

## 2018-12-18 RX ORDER — AMOXICILLIN 250 MG
2 CAPSULE ORAL 2 TIMES DAILY
Status: DISCONTINUED | OUTPATIENT
Start: 2018-12-18 | End: 2018-12-19 | Stop reason: HOSPADM

## 2018-12-18 RX ORDER — LORAZEPAM 1 MG/1
0.5 TABLET ORAL 2 TIMES DAILY PRN
Status: DISCONTINUED | OUTPATIENT
Start: 2018-12-18 | End: 2018-12-19 | Stop reason: HOSPADM

## 2018-12-18 RX ORDER — HYDROMORPHONE HYDROCHLORIDE 1 MG/ML
0.5 INJECTION, SOLUTION INTRAMUSCULAR; INTRAVENOUS; SUBCUTANEOUS ONCE
Status: DISCONTINUED | OUTPATIENT
Start: 2018-12-18 | End: 2018-12-19

## 2018-12-18 RX ORDER — ONDANSETRON 4 MG/1
4 TABLET, ORALLY DISINTEGRATING ORAL EVERY 4 HOURS PRN
Status: DISCONTINUED | OUTPATIENT
Start: 2018-12-18 | End: 2018-12-19 | Stop reason: HOSPADM

## 2018-12-18 RX ORDER — ONDANSETRON 2 MG/ML
4 INJECTION INTRAMUSCULAR; INTRAVENOUS ONCE
Status: COMPLETED | OUTPATIENT
Start: 2018-12-18 | End: 2018-12-18

## 2018-12-18 RX ADMIN — KETOROLAC TROMETHAMINE 30 MG: 30 INJECTION, SOLUTION INTRAMUSCULAR at 17:15

## 2018-12-18 RX ADMIN — OXYCODONE HYDROCHLORIDE 5 MG: 5 TABLET ORAL at 22:20

## 2018-12-18 RX ADMIN — MORPHINE SULFATE 4 MG: 10 INJECTION INTRAVENOUS at 19:30

## 2018-12-18 RX ADMIN — ONDANSETRON HYDROCHLORIDE 4 MG: 2 INJECTION, SOLUTION INTRAMUSCULAR; INTRAVENOUS at 19:30

## 2018-12-18 RX ADMIN — DIAZEPAM 5 MG: 5 TABLET ORAL at 17:15

## 2018-12-18 ASSESSMENT — ENCOUNTER SYMPTOMS
BACK PAIN: 1
DIZZINESS: 0
DEPRESSION: 0
FEVER: 0
MYALGIAS: 0
TINGLING: 0
DIARRHEA: 0
ABDOMINAL PAIN: 0
SORE THROAT: 0
WHEEZING: 0
COUGH: 0
PALPITATIONS: 0
VOMITING: 0
HEADACHES: 0
FOCAL WEAKNESS: 0
PHOTOPHOBIA: 0
SHORTNESS OF BREATH: 0
CHILLS: 0
NAUSEA: 0

## 2018-12-18 ASSESSMENT — PATIENT HEALTH QUESTIONNAIRE - PHQ9
2. FEELING DOWN, DEPRESSED, IRRITABLE, OR HOPELESS: NOT AT ALL
1. LITTLE INTEREST OR PLEASURE IN DOING THINGS: NOT AT ALL
SUM OF ALL RESPONSES TO PHQ9 QUESTIONS 1 AND 2: 0

## 2018-12-18 ASSESSMENT — PAIN SCALES - GENERAL
PAINLEVEL_OUTOF10: 5
PAINLEVEL_OUTOF10: 5
PAINLEVEL_OUTOF10: 8

## 2018-12-18 ASSESSMENT — LIFESTYLE VARIABLES: EVER_SMOKED: YES

## 2018-12-18 NOTE — ED TRIAGE NOTES
Chief Complaint   Patient presents with   • Back Pain     lower back     Pt bib ems c/o lower back pain, she has history of chronic back pain and takes Ativan and Flexeril but no relief.  Pt was given Morphine 6mg and Zofran 4mg pta

## 2018-12-19 ENCOUNTER — PATIENT OUTREACH (OUTPATIENT)
Dept: HEALTH INFORMATION MANAGEMENT | Facility: OTHER | Age: 58
End: 2018-12-19

## 2018-12-19 VITALS
HEIGHT: 68 IN | OXYGEN SATURATION: 96 % | WEIGHT: 158.73 LBS | DIASTOLIC BLOOD PRESSURE: 58 MMHG | TEMPERATURE: 97.5 F | RESPIRATION RATE: 18 BRPM | BODY MASS INDEX: 24.06 KG/M2 | SYSTOLIC BLOOD PRESSURE: 110 MMHG | HEART RATE: 81 BPM

## 2018-12-19 PROBLEM — R03.0 ELEVATED BLOOD PRESSURE READING: Status: RESOLVED | Noted: 2018-12-18 | Resolved: 2018-12-19

## 2018-12-19 PROBLEM — M62.830 SPASM OF MUSCLE OF LOWER BACK: Status: RESOLVED | Noted: 2018-12-18 | Resolved: 2018-12-19

## 2018-12-19 PROBLEM — R42 DIZZINESS: Status: RESOLVED | Noted: 2018-04-10 | Resolved: 2018-12-19

## 2018-12-19 PROBLEM — M62.830 SPASM OF MUSCLE OF LOWER BACK: Status: ACTIVE | Noted: 2018-12-18

## 2018-12-19 PROCEDURE — 700102 HCHG RX REV CODE 250 W/ 637 OVERRIDE(OP): Performed by: INTERNAL MEDICINE

## 2018-12-19 PROCEDURE — A9270 NON-COVERED ITEM OR SERVICE: HCPCS | Performed by: INTERNAL MEDICINE

## 2018-12-19 PROCEDURE — 700111 HCHG RX REV CODE 636 W/ 250 OVERRIDE (IP): Performed by: HOSPITALIST

## 2018-12-19 PROCEDURE — 700102 HCHG RX REV CODE 250 W/ 637 OVERRIDE(OP): Performed by: HOSPITALIST

## 2018-12-19 PROCEDURE — A9270 NON-COVERED ITEM OR SERVICE: HCPCS | Performed by: NURSE PRACTITIONER

## 2018-12-19 PROCEDURE — G8988 SELF CARE GOAL STATUS: HCPCS | Mod: CI

## 2018-12-19 PROCEDURE — 51798 US URINE CAPACITY MEASURE: CPT

## 2018-12-19 PROCEDURE — G8979 MOBILITY GOAL STATUS: HCPCS | Mod: CI

## 2018-12-19 PROCEDURE — 96376 TX/PRO/DX INJ SAME DRUG ADON: CPT

## 2018-12-19 PROCEDURE — 96365 THER/PROPH/DIAG IV INF INIT: CPT

## 2018-12-19 PROCEDURE — 700111 HCHG RX REV CODE 636 W/ 250 OVERRIDE (IP): Performed by: EMERGENCY MEDICINE

## 2018-12-19 PROCEDURE — G8987 SELF CARE CURRENT STATUS: HCPCS | Mod: CK

## 2018-12-19 PROCEDURE — 700111 HCHG RX REV CODE 636 W/ 250 OVERRIDE (IP): Performed by: INTERNAL MEDICINE

## 2018-12-19 PROCEDURE — G0378 HOSPITAL OBSERVATION PER HR: HCPCS

## 2018-12-19 PROCEDURE — 99217 PR OBSERVATION CARE DISCHARGE: CPT | Performed by: INTERNAL MEDICINE

## 2018-12-19 PROCEDURE — 97161 PT EVAL LOW COMPLEX 20 MIN: CPT

## 2018-12-19 PROCEDURE — G8978 MOBILITY CURRENT STATUS: HCPCS | Mod: CL

## 2018-12-19 PROCEDURE — 700102 HCHG RX REV CODE 250 W/ 637 OVERRIDE(OP): Performed by: NURSE PRACTITIONER

## 2018-12-19 PROCEDURE — 97165 OT EVAL LOW COMPLEX 30 MIN: CPT

## 2018-12-19 PROCEDURE — 700105 HCHG RX REV CODE 258: Performed by: INTERNAL MEDICINE

## 2018-12-19 PROCEDURE — A9270 NON-COVERED ITEM OR SERVICE: HCPCS | Performed by: HOSPITALIST

## 2018-12-19 RX ORDER — HYDROCODONE BITARTRATE AND ACETAMINOPHEN 5; 325 MG/1; MG/1
1-2 TABLET ORAL EVERY 6 HOURS PRN
Status: DISCONTINUED | OUTPATIENT
Start: 2018-12-19 | End: 2018-12-19 | Stop reason: HOSPADM

## 2018-12-19 RX ORDER — NAPROXEN 500 MG/1
500 TABLET ORAL 2 TIMES DAILY
Status: DISCONTINUED | OUTPATIENT
Start: 2018-12-19 | End: 2018-12-19 | Stop reason: HOSPADM

## 2018-12-19 RX ORDER — HYDROMORPHONE HYDROCHLORIDE 2 MG/ML
0.5 INJECTION, SOLUTION INTRAMUSCULAR; INTRAVENOUS; SUBCUTANEOUS ONCE
Status: COMPLETED | OUTPATIENT
Start: 2018-12-19 | End: 2018-12-19

## 2018-12-19 RX ORDER — BACLOFEN 10 MG/1
10 TABLET ORAL 3 TIMES DAILY
Status: DISCONTINUED | OUTPATIENT
Start: 2018-12-19 | End: 2018-12-19 | Stop reason: HOSPADM

## 2018-12-19 RX ORDER — BACLOFEN 10 MG/1
10 TABLET ORAL 3 TIMES DAILY
Status: DISCONTINUED | OUTPATIENT
Start: 2018-12-19 | End: 2018-12-19

## 2018-12-19 RX ORDER — BACLOFEN 10 MG/1
10 TABLET ORAL 3 TIMES DAILY PRN
Qty: 45 TAB | Refills: 0 | Status: SHIPPED | OUTPATIENT
Start: 2018-12-19 | End: 2019-06-07

## 2018-12-19 RX ORDER — NAPROXEN 375 MG/1
375 TABLET ORAL 2 TIMES DAILY
Status: DISCONTINUED | OUTPATIENT
Start: 2018-12-19 | End: 2018-12-19

## 2018-12-19 RX ORDER — NAPROXEN 500 MG/1
500 TABLET ORAL 2 TIMES DAILY WITH MEALS
Qty: 20 TAB | Refills: 0 | Status: SHIPPED | OUTPATIENT
Start: 2018-12-19 | End: 2019-06-07

## 2018-12-19 RX ORDER — AMOXICILLIN 250 MG
2 CAPSULE ORAL 2 TIMES DAILY
Qty: 30 TAB | Refills: 0 | Status: SHIPPED | OUTPATIENT
Start: 2018-12-19 | End: 2019-01-02

## 2018-12-19 RX ORDER — POLYETHYLENE GLYCOL 3350 17 G/17G
POWDER, FOR SOLUTION ORAL
Refills: 3 | COMMUNITY
Start: 2018-12-19 | End: 2019-01-02

## 2018-12-19 RX ORDER — HYDROCODONE BITARTRATE AND ACETAMINOPHEN 5; 325 MG/1; MG/1
1 TABLET ORAL EVERY 6 HOURS PRN
Qty: 28 TAB | Refills: 0 | Status: SHIPPED | OUTPATIENT
Start: 2018-12-19 | End: 2018-12-26

## 2018-12-19 RX ORDER — KETOROLAC TROMETHAMINE 30 MG/ML
60 INJECTION, SOLUTION INTRAMUSCULAR; INTRAVENOUS ONCE
Status: DISCONTINUED | OUTPATIENT
Start: 2018-12-19 | End: 2018-12-19

## 2018-12-19 RX ADMIN — HYDROCODONE BITARTRATE AND ACETAMINOPHEN 2 TABLET: 5; 325 TABLET ORAL at 13:25

## 2018-12-19 RX ADMIN — METHOCARBAMOL 1000 MG: 100 INJECTION INTRAMUSCULAR; INTRAVENOUS at 09:39

## 2018-12-19 RX ADMIN — HYDROMORPHONE HYDROCHLORIDE 0.5 MG: 2 INJECTION INTRAMUSCULAR; INTRAVENOUS; SUBCUTANEOUS at 00:42

## 2018-12-19 RX ADMIN — NAPROXEN 375 MG: 375 TABLET ORAL at 09:39

## 2018-12-19 RX ADMIN — BACLOFEN 10 MG: 10 TABLET ORAL at 13:23

## 2018-12-19 RX ADMIN — ONDANSETRON 4 MG: 4 TABLET, ORALLY DISINTEGRATING ORAL at 13:25

## 2018-12-19 RX ADMIN — OXYCODONE HYDROCHLORIDE 5 MG: 5 TABLET ORAL at 07:01

## 2018-12-19 ASSESSMENT — COGNITIVE AND FUNCTIONAL STATUS - GENERAL
SUGGESTED CMS G CODE MODIFIER DAILY ACTIVITY: CK
MOVING FROM LYING ON BACK TO SITTING ON SIDE OF FLAT BED: UNABLE
DRESSING REGULAR UPPER BODY CLOTHING: A LITTLE
CLIMB 3 TO 5 STEPS WITH RAILING: A LOT
SUGGESTED CMS G CODE MODIFIER MOBILITY: CL
DAILY ACTIVITIY SCORE: 16
WALKING IN HOSPITAL ROOM: A LITTLE
DRESSING REGULAR LOWER BODY CLOTHING: A LOT
PERSONAL GROOMING: A LITTLE
MOBILITY SCORE: 11
HELP NEEDED FOR BATHING: A LOT
TURNING FROM BACK TO SIDE WHILE IN FLAT BAD: A LOT
MOVING TO AND FROM BED TO CHAIR: UNABLE
STANDING UP FROM CHAIR USING ARMS: A LOT
TOILETING: A LOT

## 2018-12-19 ASSESSMENT — LIFESTYLE VARIABLES
HOW MANY TIMES IN THE PAST YEAR HAVE YOU HAD 5 OR MORE DRINKS IN A DAY: 0
EVER HAD A DRINK FIRST THING IN THE MORNING TO STEADY YOUR NERVES TO GET RID OF A HANGOVER: NO
TOTAL SCORE: 0
ALCOHOL_USE: YES
CONSUMPTION TOTAL: NEGATIVE
ON A TYPICAL DAY WHEN YOU DRINK ALCOHOL HOW MANY DRINKS DO YOU HAVE: 1
HAVE YOU EVER FELT YOU SHOULD CUT DOWN ON YOUR DRINKING: NO
TOTAL SCORE: 0
AVERAGE NUMBER OF DAYS PER WEEK YOU HAVE A DRINK CONTAINING ALCOHOL: 1
TOTAL SCORE: 0
HAVE PEOPLE ANNOYED YOU BY CRITICIZING YOUR DRINKING: NO
EVER FELT BAD OR GUILTY ABOUT YOUR DRINKING: NO

## 2018-12-19 ASSESSMENT — GAIT ASSESSMENTS
DEVIATION: ANTALGIC;STEP TO;BRADYKINETIC;SHUFFLED GAIT
DISTANCE (FEET): 20
GAIT LEVEL OF ASSIST: CONTACT GUARD ASSIST
ASSISTIVE DEVICE: FRONT WHEEL WALKER

## 2018-12-19 ASSESSMENT — PAIN SCALES - GENERAL
PAINLEVEL_OUTOF10: 8
PAINLEVEL_OUTOF10: 7
PAINLEVEL_OUTOF10: 5
PAINLEVEL_OUTOF10: 7

## 2018-12-19 ASSESSMENT — ACTIVITIES OF DAILY LIVING (ADL): TOILETING: INDEPENDENT

## 2018-12-19 NOTE — ED PROVIDER NOTES
"ED Provider Note    CHIEF COMPLAINT  Chief Complaint   Patient presents with   • Back Pain     lower back       HPI  Mariela Machado is a 58 y.o. female with a history of intermittent lower back pain who presents complaining of severe low back pain.    Patient states that she was putting moisturizer on her legs after showering today and felt \"a twinge of pain\" in her lower back.  Several minutes later she was putting on her pants and when she bent over she had acute onset of sharp, bilateral low back pain, left greater than right.  This pain increases with any kind of movement.  She states she called her  to alert him that she was in pain and would be lying down.  When he arrived 2 hours later home from work she states she attempted to get up but had increased pain and began shaking all over with severe spasm at which time she noted left hand and left leg tingling.  She still has left leg tingling but the hand has resolved.  She denies weakness, saddle anesthesia, fever, chills, headache, urinary symptoms, incontinence, trauma, recent procedures or IV drug use.    Patient states for her pain in the past Flexeril and Vicodin have worked.    No history of hypertension, known AAA in self or family.  Patient quit smoking in 1999.    Patient is a history of her back \"acting up\" occasionally.  She had an MRI at one time that diagnosed her with bulging disks and arthritis.    ALLERGIES  No Known Allergies    CURRENT MEDICATIONS  Home Medications     Reviewed by Marjorie Desai R.N. (Registered Nurse) on 12/18/18 at 1532  Med List Status: Partial   Medication Last Dose Status   Biotin 1000 MCG Tab  Active   Calcium-Magnesium-Zinc 167-83-8 MG Tab  Active   estradiol (ESTRACE) 1 MG Tab  Active   LORazepam (ATIVAN) 0.5 MG Tab  Active   meclizine (ANTIVERT) 12.5 MG Tab  Active   Misc Natural Products (ESTROVEN ENERGY PO)  Active   naproxen (NAPROSYN) 500 MG Tab  Active   Omega-3 Fatty Acids (FISH OIL) 1000 MG Cap capsule " " Active   vitamin D (CHOLECALCIFEROL) 1000 UNIT Tab  Active                PAST MEDICAL HISTORY   has a past medical history of Menopause and Vertigo.    SURGICAL HISTORY  patient denies any surgical history    SOCIAL HISTORY  Social History     Social History Main Topics   • Smoking status: Never Smoker   • Smokeless tobacco: Never Used   • Alcohol use 1.2 oz/week     2 Cans of beer per week   • Drug use: No   • Sexual activity: Not on file         Family Hx:  No family history of AAA      REVIEW OF SYSTEMS  See HPI for further details.  All other systems are negative except as above in HPI.      PHYSICAL EXAM  VITAL SIGNS: BP (!) 170/90   Pulse 100   Temp 36.9 °C (98.4 °F) (Temporal)   Resp 16   Ht 1.727 m (5' 8\")   Wt 74.8 kg (165 lb)   SpO2 100%   BMI 25.09 kg/m²     General:  WDWN, nontoxic appearing but appears uncomfortable, lying on her back and still on the stretcher semicoma; A+Ox3; V/S as above; hypertensive, afebrile  Skin: warm and dry; good color; no rash  HEENT: NCAT; EOMs intact; PERRL; no scleral icterus   Neck: FROM; nontender  Cardiovascular: Regular heart rate and rhythm.  No murmurs, rubs, or gallops; pulses 2+ bilaterally radially and DP areas  Lungs: Clear to auscultation with good air movement bilaterally.  No wheezes, rhonchi, or rales.   Abdomen: BS present; soft; NTND; no rebound, guarding, or rigidity.  No organomegaly or pulsatile mass  Extremities: CARTER x 4; no e/o trauma; no pedal edema; neg Roxanne's  Neurologic: CNs III-XII grossly intact; speech clear; distal sensation intact; strength 5/5 UE/LEs; DTRs 2+ bilaterally in patellar/BR areas; dorsi/plantar flexion is 5 out of 5 in the great toes bilaterally  Psychiatric: Appropriate affect, normal mood    MR-LUMBAR SPINE-W/O   Final Result      1.  Mild degenerative disc disease without central stenosis at any lumbar level. Borderline left foraminal stenosis at L5-S1.   2.  No evidence of cord compression of the lower " "thoracic cord in the field-of-view or conus medullaris.          MEDICAL RECORD  I have reviewed patient's medical record and pertinent results are listed below.      COURSE & MEDICAL DECISION MAKING  I have reviewed any medical record information, laboratory studies and radiographic results as noted.    Mariela Machado is a 58 y.o. female who presents complaining of low back pain.  She reported tingling in her left leg and arm while she was in severe pain and shaking with a spasm.  The arm symptoms has resolved, she has persistent tingling in her left leg.  I suspect hyperventilation and radiculopathy in her lower leg rather than CVA.  I do not suspect AAA.  I do not suspect epidural abscess given the acute onset of this.  Patient is not febrile and does not use IV drugs.  I do not suspect cauda equina syndrome.      Pt was re-evaluated at 7:10 PM  Pt c/o continued pain w/o relief after Toradol and Valium.  Pt was unable to void lying on the stretcher--she states \"I wanted to but I couldn't.\"  Will admit for MRI and pain control.    I received a call from the RN requesting additional pain medication so the patient may have her MRI.  Dilaudid 0.5 mg IV was ordered.    10:37 PM  I received a call from Dr. Rogers from radiology who reports degenerative disc disease in the lumbar spine with an annular tear at L5-S1 with accompanying borderline left foraminal stenosis.  No evidence of cauda equina syndrome/cord compression.    FINAL IMPRESSION  1. Lumbar back pain    2. Urinary retention        Electronically signed by: Negrita Abreu, 12/18/2018 4:54 PM          "

## 2018-12-19 NOTE — THERAPY
"Physical Therapy Evaluation completed.   Bed Mobility:  Supine to Sit: Moderate Assist  Transfers: Sit to Stand: Moderate Assist  Gait: Level Of Assist: Contact Guard Assist with Front-Wheel Walker       Plan of Care: Will benefit from Physical Therapy 3 times per week and Plan to complete next treatment by Friday 12/21  Discharge Recommendations: Equipment: Front-Wheel Walker. Post-acute therapy Discharge to home with outpatient or home health for additional skilled therapy services.    Pt is a 58 year old female admitted to the hospital for back pain, back spasms and radicular symptoms down L LE. Pt reports chronic back issues and has had outpatient PT in the past. Pt educated on spinal precautions for safety. Pt required extra time and effort for all mobility tasks. Moderate assist for supine to sit. Moderate assist for sit to stand with FWW. Cues for proper UE and LE positioning during sit to stand. Pt ambulated short distance in room with FWW, CGA. Pt with slow, antalgic gait pattern. Pt with poor terminal knee extension during stance phase and ambulating with slightly crouched gait pattern. Assisted pt back to bed. Pt would benefit from ongoing PT intervention while in the acute care setting to address the listed deficits and improve mobility prior to DC. If pt Dc'd home, will need FWW and physical assist from spouse.     See \"Rehab Therapy-Acute\" Patient Summary Report for complete documentation.     "

## 2018-12-19 NOTE — H&P
Hospital Medicine History & Physical Note    Date of Service  12/18/2018    Primary Care Physician  KIMBERLEY Zarco    Consultants  None    Code Status  Full    Chief Complaint  Chief Complaint   Patient presents with   • Back Pain     lower back       History of Presenting Illness  58 y.o. female who presented on 12/18/2018 with acute back pain.  Patient carries a history of intermittent back pain and comes in with complaints of worsening of her chronic issues after bending over this morning while putting on her pants.  Her pain is described as bilateral, lower back, sharp, with no alleviating factors.  She did try to rest but when she got up again, the pain worsened and patient reports the spasms associated with left hand and leg tingling.  The paresthesias has since resolved but she reports difficulty urinating.  No saddle anesthesia.  No recent trauma.  Otherwise no fevers, chills, nausea, vomiting, headache, vision changes, shortness of breath, diarrhea or dysuria.      Review of Systems  Review of Systems   Constitutional: Negative for chills and fever.   HENT: Negative for congestion and sore throat.    Eyes: Negative for photophobia.   Respiratory: Negative for cough, shortness of breath and wheezing.    Cardiovascular: Negative for chest pain and palpitations.   Gastrointestinal: Negative for abdominal pain, diarrhea, nausea and vomiting.   Genitourinary: Negative for dysuria.   Musculoskeletal: Positive for back pain. Negative for myalgias.   Skin: Negative.    Neurological: Negative for dizziness, tingling, focal weakness and headaches.   Psychiatric/Behavioral: Negative for depression and suicidal ideas.       Past Medical History  Past Medical History:   Diagnosis Date   • Menopause    • Vertigo        Surgical History  None    Family History  History reviewed. No pertinent family history.    Social History  Social History   Substance Use Topics   • Smoking status: Never Smoker   • Smokeless  tobacco: Never Used   • Alcohol use 1.2 oz/week     2 Cans of beer per week       Allergies  No Known Allergies    Medications  No current facility-administered medications on file prior to encounter.      Current Outpatient Prescriptions on File Prior to Encounter   Medication Sig Dispense Refill   • LORazepam (ATIVAN) 0.5 MG Tab Take 1 Tab by mouth 2 times a day as needed for Anxiety for up to 30 days. 30 Tab 0   • estradiol (ESTRACE) 1 MG Tab Take 1 Tab by mouth every day. 90 Tab 1   • Omega-3 Fatty Acids (FISH OIL) 1000 MG Cap capsule Take 1,000 mg by mouth 3 times a day, with meals.     • Biotin 1000 MCG Tab Take  by mouth.     • vitamin D (CHOLECALCIFEROL) 1000 UNIT Tab Take 1,000 Units by mouth every day.     • Misc Natural Products (ESTROVEN ENERGY PO) Take  by mouth.     • Calcium-Magnesium-Zinc 167-83-8 MG Tab Take  by mouth.     • meclizine (ANTIVERT) 12.5 MG Tab Take 1 Tab by mouth 3 times a day as needed. 30 Tab 0   • naproxen (NAPROSYN) 500 MG Tab Take 1 Tab by mouth 2 times a day, with meals. 60 Tab 1       Physical Exam  Hemodynamics  Temp (24hrs), Av.9 °C (98.4 °F), Min:36.9 °C (98.4 °F), Max:36.9 °C (98.4 °F)   Temperature: 36.9 °C (98.4 °F)  Pulse  Av  Min: 100  Max: 100    Blood Pressure: (!) 170/90      Respiratory      Respiration: 16, Pulse Oximetry: 100 %             Physical Exam   Constitutional: She is oriented to person, place, and time. No distress.   HENT:   Head: Normocephalic and atraumatic.   Right Ear: External ear normal.   Left Ear: External ear normal.   Eyes: EOM are normal. Right eye exhibits no discharge. Left eye exhibits no discharge.   Neck: Neck supple. No JVD present.   Cardiovascular: Normal rate, regular rhythm and normal heart sounds.    Pulmonary/Chest: Effort normal and breath sounds normal. No respiratory distress. She exhibits no tenderness.   Abdominal: Soft. Bowel sounds are normal. She exhibits no distension. There is no tenderness.   Musculoskeletal:  Normal range of motion. She exhibits no edema.   Neurological: She is alert and oriented to person, place, and time. No cranial nerve deficit.   Skin: Skin is warm and dry. She is not diaphoretic. No erythema.   Psychiatric: She has a normal mood and affect. Her behavior is normal.   Nursing note and vitals reviewed.    Capillary refill less than 3 seconds, distal pulses intact    Laboratory:          No results for input(s): ALTSGPT, ASTSGOT, ALKPHOSPHAT, TBILIRUBIN, DBILIRUBIN, GAMMAGT, AMYLASE, LIPASE, ALB, PREALBUMIN, GLUCOSE in the last 72 hours.              No results found for: TROPONINI    Imaging  No results found.      Assessment/Plan:  Anticipate that patient will need less than 2 midnights for management of the discussed medical issues.    * Back pain   Assessment & Plan    She with a history of previous intermittent lower back pain, now with acute worsening.  This is associated with difficulty urinating which would be concerning for neurologic involvement.  An MRI of the lumbar spine has been ordered and is pending.  In the meantime, the patient will be admitted to the hospital for every 4-hour neurology checks, symptomatic management of her pain, and pending the results of the MRI, she may require a neurosurgical consultation.     Elevated blood pressure reading   Assessment & Plan    No history of hypertension, possibly related to pain.  Control pain as noted above, add as needed as needed IV antihypertensives.  If patient has consistent need of blood pressure medications despite control of her pain, then we will start her on long-standing medication.     Anxiety- (present on admission)   Assessment & Plan    This is chronic, okay to use home Ativan as needed.     Dizziness- (present on admission)   Assessment & Plan    Reported history of vertigo, Antivert as needed.         Prophylaxis: Sequential compression devices for DVT prophylaxis, no PPI indicated, bowel protocol as needed

## 2018-12-19 NOTE — CARE PLAN
Problem: Communication  Goal: The ability to communicate needs accurately and effectively will improve  Outcome: PROGRESSING AS EXPECTED  POC discussed r/t current meds/ pain scale/ need to reposition- understands

## 2018-12-19 NOTE — PROGRESS NOTES
Received pt from ER @ 2120. A&O x4. Afebrile/ denies chills. Good appetite/ snacks provided. Received on RA satting in 90's but placed on 2LO2 d/t desatting down to high 80's during sleep. Denies cough/SOB. Reports intermittent nausea in ER w/ IV meds/ No emesis/ but resolved on admission to floor. Pt denies bladder spasms/pressure/discomfort @ this time/ cath'd in ER r/t retention and per [facility] ER RN -800mls out.LBM 12/18/18. Reports being Unable to ambulate/ guarded condition/ prefers minimal movt/ encouraged repositioning for skin risk- refuses despite education. Medicated w/ PRNS and effective on reasessment. Pt denies recent hx of fall. Reports bending down @ home to pick something off floor and pain worsened despite home meds. Reports chronic back since 17 yrs old that becomes unbearable every other year. Denies assistive device @ home. Encouraged to call for all needs- understands. Hourly and PRN rounding in place.

## 2018-12-19 NOTE — DISCHARGE PLANNING
Care Transition Team Assessment    Spoke with patient at bedside. Anticipate no needs @ present time. Spouse will be ride @ D/C.     Information Source  Orientation : Oriented x 4  Information Given By: Patient    Readmission Evaluation  Is this a readmission?: No  Interdisciplinary Discharge Planning  Does Admitting Nurse Feel This Could be a Complex Discharge?: No  Primary Care Physician: Chad  Lives with - Patient's Self Care Capacity: Spouse  Patient or legal guardian wants to designate a caregiver (see row info): No  Support Systems: Spouse / Significant Other  Do You Take your Prescribed Medications Regularly: Yes  Able to Return to Previous ADL's: Yes  Mobility Issues: No  Prior Services: None  Patient Expects to be Discharged to:: Home  Assistance Needed: No    Discharge Preparedness  What are your discharge supports?: Spouse  Prior Functional Level: Ambulatory  Difficulity with ADLs: None    Functional Assesment  Prior Functional Level: Ambulatory    Finances  Prescription Coverage: Yes      Anticipated Discharge Information  Anticipated discharge disposition: Home  Discharge Address: [facility] [address] [place]  Discharge Contact Phone Number: [phone]

## 2018-12-19 NOTE — CARE PLAN
Problem: Pain Management  Goal: Pain level will decrease to patient's comfort goal  Pain meds effective on reassessment

## 2018-12-19 NOTE — CARE PLAN
Problem: Safety  Goal: Will remain free from injury  Outcome: PROGRESSING AS EXPECTED  Pt calls appropriately for needs/ fall precautions in place

## 2018-12-19 NOTE — PROGRESS NOTES
Oswaldo from traction paged for stat orders for Bladder scan this AM- pt reports slight pressure to bladder. Only 95 mls present @ this time- No orders for straight cath in. Pt agrees to fx pan to attempt to void.

## 2018-12-19 NOTE — PROGRESS NOTES
Assist RN; Rounds completed. Pt medicated for pain prior to discharge for comfort. Medicated per MAR Pt not driving

## 2018-12-19 NOTE — DISCHARGE SUMMARY
Discharge Summary    CHIEF COMPLAINT ON ADMISSION  Chief Complaint   Patient presents with   • Back Pain     lower back       Reason for Admission  EMS [facility]     Admission Date  12/18/2018    CODE STATUS  Full Code    HPI & HOSPITAL COURSE  This is a 58 y.o. female here with acute exacerbation of chronic low back pain with muscle spasm.  Please see the dictated history of present illness 12/18/2018 for complete details.  In short, the patient was adjusting her pant leg when she developed acute low back pain without radiation.  She denied associated paresthesias, motor deficiency saddle paresthesia, bowel or bladder incontinence.  However the patient did report some degree of urinary retention and thus MRI of the lumbar spine was obtained.  This was negative for central stenosis at any level in the lumbar spine and did show mild borderline left foraminal stenosis at L5-S1 due to a minimal disc herniation.  There was no evidence of cord compression in the high lumbar region or nerve roots of the lower conus medullaris.  Subsequent post void bladder scan showed 95 mL of residual urine.  The following morning the patient's was quite hesitant to participate with the physical exam primarily due to pain.  Again her symptoms were confined to the low back and she was reluctant to move her legs due to muscle spasm.  She was given IV Robaxin and later oral naproxen and Norco and baclofen with therapeutic response.  She then was able to ambulate with standby assist to the restroom where she was able to void normally.    At discharge she will be given a prescription for baclofen, naproxen and Norco.  Also give her an outpatient referral to physical therapy, and encouraged her to follow-up with her PCP. In prescribing controlled substances to this patient, I certify that I have obtained and reviewed the medical history of Mariela Machado. I have also made a good darius effort to obtain applicable records from other providers who have  treated the patient and records did not demonstrate any increased risk of substance abuse that would prevent me from prescribing controlled substances.     I have conducted a physical exam and documented it. I have reviewed Ms. Machado’s prescription history as maintained by the [place] Prescription Monitoring Program.     I have assessed the patient’s risk for abuse, dependency, and addiction using the validated Opioid Risk Tool available at https://www.mdcalc.com/qtrxsp-tfyp-hrdq-ort-narcotic-abuse.     Given the above, I believe the benefits of controlled substance therapy outweigh the risks. The reasons for prescribing controlled substances include non-narcotic, oral analgesic alternatives have been inadequate for pain control. Accordingly, I have discussed the risk and benefits, treatment plan, and alternative therapies with the patient.  Therefore, she is discharged in good and stable condition to home with close outpatient follow-up.    Discharge Date  12/19/2018    FOLLOW UP ITEMS POST DISCHARGE  None    DISCHARGE DIAGNOSES  Principal Problem:    Spasm of muscle of lower back POA: Yes  Active Problems:    Elevated blood pressure reading POA: Yes    Dizziness POA: Yes    Anxiety POA: Yes  Resolved Problems:    * No resolved hospital problems. *      FOLLOW UP  Future Appointments  Date Time Provider Department Center   1/2/2019 3:40 PM EUGENE Zarco[facility]     [facility], Emergency Dept  [address] [address] [phone]    As needed, If symptoms worsen    PATIENCE ZarcoRBerniceNBernice  [address] [place] [address] [phone]    Call   for recheck in 2-3 days      MEDICATIONS ON DISCHARGE     Medication List      START taking these medications      Instructions   baclofen 10 MG Tabs  Commonly known as:  LIORESAL   Take 1 Tab by mouth 3 times a day as needed (Spasm).  Dose:  10 mg     naproxen 500 MG Tabs  Commonly known as:  NAPROSYN    Take 1 Tab by mouth 2 times a day, with meals.  Dose:  500 mg     polyethylene glycol/lytes Pack  Commonly known as:  MIRALAX   Take  by mouth 1 time daily as needed (if sennosides and docusate ineffective after 24 hours).     senna-docusate 8.6-50 MG Tabs  Commonly known as:  PERICOLACE or SENOKOT S   Take 2 Tabs by mouth 2 Times a Day.  Dose:  2 Tab        CHANGE how you take these medications      Instructions   HYDROcodone-acetaminophen 5-325 MG Tabs per tablet  What changed:  · when to take this  · reasons to take this  · additional instructions  Commonly known as:  NORCO   Take 1 Tab by mouth every 6 hours as needed for up to 7 days.  Dose:  1 Tab        CONTINUE taking these medications      Instructions   Biotin 1000 MCG Tabs   Take 1,000 mcg by mouth every day.  Dose:  1000 mcg     estradiol 1 MG Tabs  Commonly known as:  ESTRACE   Take 1 Tab by mouth every day.  Dose:  1 mg     fish oil 1000 MG Caps capsule   Take 1,000 mg by mouth every day.  Dose:  1000 mg     fluticasone 50 MCG/ACT nasal spray  Commonly known as:  FLONASE   Spray 1 Spray in nose every day.  Dose:  1 Spray     LORazepam 0.5 MG Tabs  Commonly known as:  ATIVAN   Take 1 Tab by mouth 2 times a day as needed for Anxiety for up to 30 days.  Dose:  0.5 mg     ST JOHNS WORT PO   Take 1 Cap by mouth every day.  Dose:  1 Cap     vitamin D 1000 UNIT Tabs  Commonly known as:  cholecalciferol   Take 1,000 Units by mouth every day.  Dose:  1000 Units        STOP taking these medications    cyclobenzaprine 10 MG Tabs  Commonly known as:  FLEXERIL     ibuprofen 200 MG Tabs  Commonly known as:  MOTRIN            Allergies  No Known Allergies    DIET  Orders Placed This Encounter   Procedures   • Diet Order Regular     Standing Status:   Standing     Number of Occurrences:   1     Order Specific Question:   Diet:     Answer:   Regular [1]       ACTIVITY  As tolerated.  Weight bearing as  tolerated    CONSULTATIONS  None    PROCEDURES  None    LABORATORY  Lab Results   Component Value Date    SODIUM 140 04/16/2018    POTASSIUM 4.4 04/16/2018    CHLORIDE 103 04/16/2018    CO2 29 04/16/2018    GLUCOSE 86 04/16/2018    BUN 13 04/16/2018    CREATININE 0.83 04/16/2018        No results found for: WBC, HEMOGLOBIN, HEMATOCRIT, PLATELETCT     Total time of the discharge process exceeds 36 minutes.

## 2018-12-19 NOTE — THERAPY
"Occupational Therapy Evaluation completed.   Functional Status:  Mod A x2 for sit>Stand from BSC; max A for toileting; min A for grooming while standing; mod A for sit>supine; max A for self-feeding EOB; SPV for self-feeding supine  Plan of Care: Will benefit from Occupational Therapy 3 times per week  Discharge Recommendations:  Equipment: Will Continue to Assess for Equipment Needs.     See \"Rehab Therapy-Acute\" Patient Summary Report for complete documentation.    OT eval completed on 59 YO F admitted with back pain. Pt limited in functional mobs, standing tasks and BADLs 2' anticipation of back spasms with tasks. Pt with heavy BUE reliance on FWW and counters during functional mobs, transfers and standing tasks. Pt required increased time for movements 2' anticipation and fear of pain with movements. Pt would benefit from continued acute OT services in order to increase independence with functional transfers and BADLs.  "

## 2018-12-19 NOTE — PROGRESS NOTES
Discharge instructions, medications and follow-up reviewed with pt, pt verbalized understanding and denies questions. Discharge paperwork given and prescription to pt. PT slip provided to pt. PIV removed, TeleBox removed, armband removed. Pt awaiting transport.

## 2018-12-19 NOTE — ED NOTES
Med rec completed per pt at bedside  Allergies reviewed - NKDA  No ABX in last 30 days   Pt reports she took one Norco and one Flexeril form leftover RX's in her home today  Also took an Ativan before coming in today

## 2018-12-19 NOTE — ASSESSMENT & PLAN NOTE
No history of hypertension, possibly related to pain.  Control pain as noted above, add as needed as needed IV antihypertensives.  If patient has consistent need of blood pressure medications despite control of her pain, then we will start her on long-standing medication.

## 2018-12-19 NOTE — DISCHARGE INSTRUCTIONS
Discharge Instructions    Discharged to home by car with relative. Discharged via wheelchair, hospital escort: Yes.  Special equipment needed: Not Applicable    Be sure to schedule a follow-up appointment with your primary care doctor or any specialists as instructed.     Discharge Plan:   Diet Plan: Discussed  Activity Level: Discussed  Confirmed Follow up Appointment: Patient to Call and Schedule Appointment  Confirmed Symptoms Management: Discussed  Medication Reconciliation Updated: Yes  Influenza Vaccine Indication: Patient Refuses    I understand that a diet low in cholesterol, fat, and sodium is recommended for good health. Unless I have been given specific instructions below for another diet, I accept this instruction as my diet prescription.   Other diet: Regular    Special Instructions: None    · Is patient discharged on Warfarin / Coumadin?   No     Depression / Suicide Risk    As [facility] are [facility], it is important to learn how to keep safe from harming yourself.    Recognize the warning signs:  · Abrupt changes in personality, positive or negative- including increase in energy   · Giving away possessions  · Change in eating patterns- significant weight changes-  positive or negative  · Change in sleeping patterns- unable to sleep or sleeping all the time   · Unwillingness or inability to communicate  · Depression  · Unusual sadness, discouragement and loneliness  · Talk of wanting to die  · Neglect of personal appearance   · Rebelliousness- reckless behavior  · Withdrawal from people/activities they love  · Confusion- inability to concentrate     If you or a loved one observes any of these behaviors or has concerns about self-harm, here's what you can do:  · Talk about it- your feelings and reasons for harming yourself  · Remove any means that you might use to hurt yourself (examples: pills, rope, extension cords, firearm)  · Get professional help from the community  (Mental Health, Substance Abuse, psychological counseling)  · Do not be alone:Call your Safe Contact- someone whom you trust who will be there for you.  · Call your local CRISIS HOTLINE 743-7854 or 722-665-6800  · Call your local Children's Mobile Crisis Response Team [place] (906) 292-8651 or www.Stylr  · Call the toll free National Suicide Prevention Hotlines   · National Suicide Prevention Lifeline 119-543-TCDR (3666)  · National Hope Line Network 800-SUICIDE (351-2510)

## 2018-12-19 NOTE — ASSESSMENT & PLAN NOTE
She with a history of previous intermittent lower back pain, now with acute worsening.  This is associated with difficulty urinating which would be concerning for neurologic involvement.  An MRI of the lumbar spine has been ordered and is pending.  In the meantime, the patient will be admitted to the hospital for every 4-hour neurology checks, symptomatic management of her pain, and pending the results of the MRI, she may require a neurosurgical consultation.

## 2018-12-20 RX ORDER — ESTRADIOL 1 MG/1
TABLET ORAL
Qty: 90 TAB | Refills: 0 | Status: SHIPPED | OUTPATIENT
Start: 2018-12-20 | End: 2019-03-22 | Stop reason: SDUPTHER

## 2018-12-20 NOTE — TELEPHONE ENCOUNTER
Was the patient seen in the last year in this department? Yes    Does patient have an active prescription for medications requested? No     Received Request Via: Pharmacy    Pt met protocol?: Yes     Last OV 11/2018

## 2019-01-02 ENCOUNTER — OFFICE VISIT (OUTPATIENT)
Dept: MEDICAL GROUP | Facility: PHYSICIAN GROUP | Age: 59
End: 2019-01-02
Payer: COMMERCIAL

## 2019-01-02 VITALS
BODY MASS INDEX: 23.64 KG/M2 | DIASTOLIC BLOOD PRESSURE: 80 MMHG | TEMPERATURE: 98.2 F | RESPIRATION RATE: 16 BRPM | HEIGHT: 68 IN | WEIGHT: 156 LBS | HEART RATE: 76 BPM | SYSTOLIC BLOOD PRESSURE: 142 MMHG | OXYGEN SATURATION: 99 %

## 2019-01-02 DIAGNOSIS — E78.2 MIXED HYPERLIPIDEMIA: ICD-10-CM

## 2019-01-02 DIAGNOSIS — M54.50 ACUTE LEFT-SIDED LOW BACK PAIN WITHOUT SCIATICA: ICD-10-CM

## 2019-01-02 PROCEDURE — 99213 OFFICE O/P EST LOW 20 MIN: CPT | Performed by: NURSE PRACTITIONER

## 2019-01-02 ASSESSMENT — PATIENT HEALTH QUESTIONNAIRE - PHQ9: CLINICAL INTERPRETATION OF PHQ2 SCORE: 0

## 2019-01-02 NOTE — PROGRESS NOTES
Chief Complaint   Patient presents with   • Hyperlipidemia     fv labs         This is a 58 y.o.female patient that presents today with the following: Follow-up visit, status post ER visit for back pain    Mixed hyperlipidemia  The 10-year ASCVD risk score (Ping CAMEJO Jr., et al., 2013) is: 3.4%    Values used to calculate the score:      Age: 58 years      Sex: Female      Is Non- : No      Diabetic: No      Tobacco smoker: No      Systolic Blood Pressure: 142 mmHg      Is BP treated: No      HDL Cholesterol: 68 mg/dL      Total Cholesterol: 254 mg/dL  Patient is at low risk for cardiovascular event over the next 10 years, she is not currently on a statin.  She continues to work on healthy diet, regular physical activity and efforts towards maintaining her healthy weight.  We will plan on rechecking labs again in 1 year.    Acute left-sided low back pain without sciatica  Patient here status post recent hospitalization for acute left-sided low back pain.  She did have MRI which did not show any acute process other than mild degenerative disc disease.  She was given prescription for muscle relaxer and was given an steroid.  She states pain is slowly starting to improve.  She was referred to physical therapy but would like to try exercises at home, present education material was given on proper back exercises for acute low back pain.      Hospital Outpatient Visit on 12/11/2018   Component Date Value   • Cholesterol,Tot 12/11/2018 254*   • Triglycerides 12/11/2018 173*   • HDL 12/11/2018 68    • LDL 12/11/2018 151*   • Fasting Status 12/11/2018 Fasting          clinical course has been stable    Past Medical History:   Diagnosis Date   • Menopause    • Vertigo        No past surgical history on file.    No family history on file.    Patient has no known allergies.    Current Outpatient Prescriptions Ordered in [facility]   Medication Sig Dispense Refill   • estradiol (ESTRACE) 1 MG Tab TAKE 1 TABLET BY  "MOUTH ONCE DAILY 90 Tab 0   • ST JOHNS WORT PO Take 1 Cap by mouth every day.     • fluticasone (FLONASE) 50 MCG/ACT nasal spray Spray 1 Spray in nose every day.     • Omega-3 Fatty Acids (FISH OIL) 1000 MG Cap capsule Take 1,000 mg by mouth every day.     • Biotin 1000 MCG Tab Take 1,000 mcg by mouth every day.     • vitamin D (CHOLECALCIFEROL) 1000 UNIT Tab Take 1,000 Units by mouth every day.     • naproxen (NAPROSYN) 500 MG Tab Take 1 Tab by mouth 2 times a day, with meals. 20 Tab 0   • baclofen (LIORESAL) 10 MG Tab Take 1 Tab by mouth 3 times a day as needed (Spasm). 45 Tab 0     No current Epic-ordered facility-administered medications on file.        Constitutional ROS: No unexpected change in weight, No weakness, No unexplained fevers, sweats, or chills  Pulmonary ROS: No chronic cough, sputum, or hemoptysis, No shortness of breath, No recent change in breathing  Cardiovascular ROS: No chest pain, No edema, No palpitations.  Positive for hyperlipidemia  Gastrointestinal ROS: No abdominal pain, No nausea, vomiting, diarrhea, or constipation  Musculoskeletal/Extremities ROS: Positive per HPI  Neurologic ROS: Normal development, No seizures, No weakness    Physical exam:  /80 (BP Location: Left arm, Patient Position: Sitting, BP Cuff Size: Adult)   Pulse 76   Temp 36.8 °C (98.2 °F) (Temporal)   Resp 16   Ht 1.727 m (5' 8\")   Wt 70.8 kg (156 lb)   SpO2 99%   BMI 23.72 kg/m²   General Appearance: Middle-aged female, alert, no distress, well-nourished, well-groomed  Skin: Skin color, texture, turgor normal. No rashes or lesions.  Lungs: negative findings: normal respiratory rate and rhythm, normal effort  Musculoskeletal: positive findings: Mild to moderate limitation in range of motion to lumbar spine  Neurologic: intact, CN II through XII grossly intact    Medical decision making/discussion: Patient was given printed education material on back exercises for acute low back pain.  Advised her if pain " does not improve she should consider making an appointment with physical therapy since she was referred during her last hospitalization.  She is to continue with healthy low-fat, low-cholesterol diet, regular physical activity and continued efforts towards maintaining her healthy weight.  We will plan on repeat labs again in 1 year.    Mariela was seen today for hyperlipidemia.    Diagnoses and all orders for this visit:    Mixed hyperlipidemia    Acute left-sided low back pain without sciatica          Please note that this dictation was created using voice recognition software. I have made every reasonable attempt to correct obvious errors, but I expect that there are errors of grammar and possibly content that I did not discover before finalizing the note.

## 2019-01-02 NOTE — ASSESSMENT & PLAN NOTE
The 10-year ASCVD risk score (Ping CAMEJO Jr., et al., 2013) is: 3.4%    Values used to calculate the score:      Age: 58 years      Sex: Female      Is Non- : No      Diabetic: No      Tobacco smoker: No      Systolic Blood Pressure: 142 mmHg      Is BP treated: No      HDL Cholesterol: 68 mg/dL      Total Cholesterol: 254 mg/dL  Patient is at low risk for cardiovascular event over the next 10 years, she is not currently on a statin.  She continues to work on healthy diet, regular physical activity and efforts towards maintaining her healthy weight.  We will plan on rechecking labs again in 1 year.

## 2019-01-03 NOTE — ASSESSMENT & PLAN NOTE
Patient here status post recent hospitalization for acute left-sided low back pain.  She did have MRI which did not show any acute process other than mild degenerative disc disease.  She was given prescription for muscle relaxer and was given an steroid.  She states pain is slowly starting to improve.  She was referred to physical therapy but would like to try exercises at home, present education material was given on proper back exercises for acute low back pain.

## 2019-01-08 ENCOUNTER — HOSPITAL ENCOUNTER (OUTPATIENT)
Dept: LAB | Facility: MEDICAL CENTER | Age: 59
End: 2019-01-08
Attending: OTOLARYNGOLOGY
Payer: COMMERCIAL

## 2019-01-08 LAB
ALBUMIN SERPL BCP-MCNC: 4.5 G/DL (ref 3.2–4.9)
ALBUMIN/GLOB SERPL: 1.5 G/DL
ALP SERPL-CCNC: 59 U/L (ref 30–99)
ALT SERPL-CCNC: 13 U/L (ref 2–50)
ANION GAP SERPL CALC-SCNC: 10 MMOL/L (ref 0–11.9)
AST SERPL-CCNC: 15 U/L (ref 12–45)
BASOPHILS # BLD AUTO: 0.6 % (ref 0–1.8)
BASOPHILS # BLD: 0.04 K/UL (ref 0–0.12)
BILIRUB SERPL-MCNC: 0.9 MG/DL (ref 0.1–1.5)
BUN SERPL-MCNC: 14 MG/DL (ref 8–22)
CALCIUM SERPL-MCNC: 9.5 MG/DL (ref 8.5–10.5)
CHLORIDE SERPL-SCNC: 102 MMOL/L (ref 96–112)
CO2 SERPL-SCNC: 29 MMOL/L (ref 20–33)
CREAT SERPL-MCNC: 0.83 MG/DL (ref 0.5–1.4)
EOSINOPHIL # BLD AUTO: 0.16 K/UL (ref 0–0.51)
EOSINOPHIL NFR BLD: 2.2 % (ref 0–6.9)
ERYTHROCYTE [DISTWIDTH] IN BLOOD BY AUTOMATED COUNT: 40.4 FL (ref 35.9–50)
ERYTHROCYTE [SEDIMENTATION RATE] IN BLOOD BY WESTERGREN METHOD: 9 MM/HOUR (ref 0–30)
GLOBULIN SER CALC-MCNC: 3 G/DL (ref 1.9–3.5)
GLUCOSE SERPL-MCNC: 71 MG/DL (ref 65–99)
HCT VFR BLD AUTO: 47.3 % (ref 37–47)
HGB BLD-MCNC: 15.7 G/DL (ref 12–16)
IMM GRANULOCYTES # BLD AUTO: 0.02 K/UL (ref 0–0.11)
IMM GRANULOCYTES NFR BLD AUTO: 0.3 % (ref 0–0.9)
LYMPHOCYTES # BLD AUTO: 2.42 K/UL (ref 1–4.8)
LYMPHOCYTES NFR BLD: 33.9 % (ref 22–41)
MCH RBC QN AUTO: 30.6 PG (ref 27–33)
MCHC RBC AUTO-ENTMCNC: 33.2 G/DL (ref 33.6–35)
MCV RBC AUTO: 92.2 FL (ref 81.4–97.8)
MONOCYTES # BLD AUTO: 0.58 K/UL (ref 0–0.85)
MONOCYTES NFR BLD AUTO: 8.1 % (ref 0–13.4)
NEUTROPHILS # BLD AUTO: 3.91 K/UL (ref 2–7.15)
NEUTROPHILS NFR BLD: 54.9 % (ref 44–72)
NRBC # BLD AUTO: 0 K/UL
NRBC BLD-RTO: 0 /100 WBC
PLATELET # BLD AUTO: 358 K/UL (ref 164–446)
PMV BLD AUTO: 10 FL (ref 9–12.9)
POTASSIUM SERPL-SCNC: 3.6 MMOL/L (ref 3.6–5.5)
PROT SERPL-MCNC: 7.5 G/DL (ref 6–8.2)
RBC # BLD AUTO: 5.13 M/UL (ref 4.2–5.4)
SODIUM SERPL-SCNC: 141 MMOL/L (ref 135–145)
T4 FREE SERPL-MCNC: 0.84 NG/DL (ref 0.53–1.43)
TSH SERPL DL<=0.005 MIU/L-ACNC: 1.28 UIU/ML (ref 0.38–5.33)
WBC # BLD AUTO: 7.1 K/UL (ref 4.8–10.8)

## 2019-01-08 PROCEDURE — 85025 COMPLETE CBC W/AUTO DIFF WBC: CPT

## 2019-01-08 PROCEDURE — 84439 ASSAY OF FREE THYROXINE: CPT

## 2019-01-08 PROCEDURE — 36415 COLL VENOUS BLD VENIPUNCTURE: CPT

## 2019-01-08 PROCEDURE — 84443 ASSAY THYROID STIM HORMONE: CPT

## 2019-01-08 PROCEDURE — 85652 RBC SED RATE AUTOMATED: CPT

## 2019-01-08 PROCEDURE — 80053 COMPREHEN METABOLIC PANEL: CPT

## 2019-03-22 ENCOUNTER — TELEPHONE (OUTPATIENT)
Dept: MEDICAL GROUP | Facility: PHYSICIAN GROUP | Age: 59
End: 2019-03-22

## 2019-03-22 DIAGNOSIS — R68.82 LOW LIBIDO: ICD-10-CM

## 2019-03-22 DIAGNOSIS — N95.1 HOT FLASHES DUE TO MENOPAUSE: ICD-10-CM

## 2019-03-22 NOTE — TELEPHONE ENCOUNTER
Was the patient seen in the last year in this department? Yes    Does patient have an active prescription for medications requested? No     Received Request Via: Pharmacy      Pt met protocol?: Yes, labs and ov 1/19

## 2019-03-24 RX ORDER — ESTRADIOL 1 MG/1
TABLET ORAL
Qty: 90 TAB | Refills: 0 | Status: SHIPPED | OUTPATIENT
Start: 2019-03-24 | End: 2019-12-17

## 2019-03-25 NOTE — TELEPHONE ENCOUNTER
Hoda, Patient is taking estradiol 1 mg and has an intact uterus.  Do you also recommend progestin?

## 2019-05-27 ENCOUNTER — OFFICE VISIT (OUTPATIENT)
Dept: URGENT CARE | Facility: CLINIC | Age: 59
End: 2019-05-27
Payer: COMMERCIAL

## 2019-05-27 VITALS
TEMPERATURE: 97 F | WEIGHT: 154 LBS | DIASTOLIC BLOOD PRESSURE: 70 MMHG | SYSTOLIC BLOOD PRESSURE: 118 MMHG | BODY MASS INDEX: 23.34 KG/M2 | HEIGHT: 68 IN | OXYGEN SATURATION: 96 % | HEART RATE: 80 BPM | RESPIRATION RATE: 16 BRPM

## 2019-05-27 DIAGNOSIS — L98.9 SKIN LESION OF CHEST WALL: ICD-10-CM

## 2019-05-27 DIAGNOSIS — L30.9 CHRONIC DERMATITIS: ICD-10-CM

## 2019-05-27 PROCEDURE — 99213 OFFICE O/P EST LOW 20 MIN: CPT | Performed by: NURSE PRACTITIONER

## 2019-05-27 ASSESSMENT — ENCOUNTER SYMPTOMS
CHILLS: 0
DIAPHORESIS: 0
FEVER: 0
WEAKNESS: 0

## 2019-05-27 NOTE — PROGRESS NOTES
"Subjective:      Mariela Machado is a 59 y.o. female who presents with Cough (collar bone growth)            Patient comes in today with a 1 month history of lesion on the chest wall.  She reports that initially she noted a small scaly patch that was pruritic.  It continued to scale and then formed a small flesh toned papular lesion.  NO bleeding.  NO pain.  No history of similar skin lesions, but she does have chronic dermatitis of her chest with chronic skin redness and itching for the past 35 years approximately.  She uses cortisone cream prn.  She is not established with a dermatologist and has not one in recent history.  No fever, weight loss, night sweats, or cough.  No personal history of skin cancer or pre-cancerous lesions.          Review of Systems   Constitutional: Negative for chills, diaphoresis, fever and malaise/fatigue.   Skin: Positive for rash.   Neurological: Negative for weakness.     Medications, Allergies, and current problem list reviewed today in Epic     Objective:     /70   Pulse 80   Temp 36.1 °C (97 °F) (Temporal)   Resp 16   Ht 1.727 m (5' 8\")   Wt 69.9 kg (154 lb)   SpO2 96%   BMI 23.42 kg/m²      Physical Exam   Constitutional: She is oriented to person, place, and time. She appears well-developed and well-nourished. No distress.   Cardiovascular: Normal rate, regular rhythm and normal heart sounds.    Pulmonary/Chest: Effort normal and breath sounds normal.       Anterior chest wall demonstrates generalized redness, which patient states is her baseline dermatitis.  There is a 3 mm x 5 mm oval papular lesion on the chest wall at the medial aspect of the left clavicle.  Lesion is flesh toned and has a light scale.  No weeping or bleeding.  No TTP.     Neurological: She is alert and oriented to person, place, and time.   Skin: She is not diaphoretic.   Vitals reviewed.              Assessment/Plan:     1. Skin lesion of chest wall  - REFERRAL TO DERMATOLOGY    2. Chronic " dermatitis  - REFERRAL TO DERMATOLOGY    Discussed exam findings with patient.  Actinic Keratosis? Versus unknown etiology.  Differential reviewed.  Unknown cause of underlying dermatitis.    Follow up with dermatology as referred.  ED precautions discussed.  Patient verbalized understanding of and agreed with plan of care.

## 2019-06-07 ENCOUNTER — OFFICE VISIT (OUTPATIENT)
Dept: URGENT CARE | Facility: PHYSICIAN GROUP | Age: 59
End: 2019-06-07
Payer: COMMERCIAL

## 2019-06-07 VITALS
OXYGEN SATURATION: 94 % | HEART RATE: 108 BPM | WEIGHT: 151.6 LBS | BODY MASS INDEX: 22.97 KG/M2 | HEIGHT: 68 IN | DIASTOLIC BLOOD PRESSURE: 78 MMHG | SYSTOLIC BLOOD PRESSURE: 120 MMHG | RESPIRATION RATE: 18 BRPM | TEMPERATURE: 98.3 F

## 2019-06-07 DIAGNOSIS — J22 ACUTE RESPIRATORY INFECTION: ICD-10-CM

## 2019-06-07 PROCEDURE — 99214 OFFICE O/P EST MOD 30 MIN: CPT | Performed by: FAMILY MEDICINE

## 2019-06-07 RX ORDER — AMOXICILLIN 875 MG/1
TABLET, COATED ORAL
Qty: 20 TAB | Refills: 0 | Status: SHIPPED
Start: 2019-06-07 | End: 2020-01-13

## 2019-06-07 NOTE — PROGRESS NOTES
Chief Complaint:    Chief Complaint   Patient presents with   • Cough      x 4 days coughing up green   • Pharyngitis   • Sinusitis       History of Present Illness:    This is a new problem. Symptoms since 6/3/19. She has had fever up to 99.3 F, nasal symptoms with purulent mucus from nose, burning in sinuses, sore throat, and cough productive of purulent mucus, overall at least moderate severity. Using OTC meds for symptoms. She found some previously rx'd Amoxil with TID dosing and took 1 pill last night and one this AM. She feels the Amoxil has helped and has improved some due to this. However, she is out of med.      Review of Systems:    Constitutional: See HPI.  Eyes: Negative for change in vision, photophobia, pain, redness, and discharge.  ENT: See HPI.    Respiratory: See HPI.   Cardiovascular: Negative for chest pain, palpitations, orthopnea, claudication, leg swelling, and PND.   Gastrointestinal: Negative for abdominal pain, nausea, vomiting, diarrhea, constipation, blood in stool, and melena.   Genitourinary: Negative for dysuria, urinary urgency, urinary frequency, hematuria, and flank pain.   Musculoskeletal: Negative for myalgias, joint pain, neck pain, and back pain.   Skin: Negative for rash and itching.   Neurological: Negative for dizziness, tingling, tremors, sensory change, speech change, focal weakness, seizures, loss of consciousness, and headaches.   Endo: Negative for polydipsia.   Heme: Does not bruise/bleed easily.   Psychiatric/Behavioral: Negative for depression, suicidal ideas, hallucinations, memory loss and substance abuse. The patient is not nervous/anxious and does not have insomnia.        Past Medical History:    Past Medical History:   Diagnosis Date   • Menopause    • Vertigo      Past Surgical History:    Past Surgical History:   Procedure Laterality Date   • HYSTERECTOMY LAPAROSCOPY       Social History:    Social History     Social History   • Marital status:       "Spouse name: N/A   • Number of children: N/A   • Years of education: N/A     Occupational History   • Not on file.     Social History Main Topics   • Smoking status: Never Smoker   • Smokeless tobacco: Never Used   • Alcohol use 1.2 oz/week     2 Cans of beer per week   • Drug use: No   • Sexual activity: Not on file     Other Topics Concern   • Not on file     Social History Narrative   • No narrative on file     Family History:    No family history on file.    Medications:    Current Outpatient Prescriptions on File Prior to Visit   Medication Sig Dispense Refill   • estradiol (ESTRACE) 1 MG Tab TAKE 1 TABLET BY MOUTH ONCE DAILY 90 Tab 0   • ST JOHNS WORT PO Take 1 Cap by mouth every day.     • Omega-3 Fatty Acids (FISH OIL) 1000 MG Cap capsule Take 1,000 mg by mouth every day.     • Biotin 1000 MCG Tab Take 1,000 mcg by mouth every day.     • vitamin D (CHOLECALCIFEROL) 1000 UNIT Tab Take 1,000 Units by mouth every day.       No current facility-administered medications on file prior to visit.      Allergies:    No Known Allergies      Vitals:    Vitals:    06/07/19 1042   BP: 120/78   Pulse: (!) 108   Resp: 18   Temp: 36.8 °C (98.3 °F)   TempSrc: Temporal   SpO2: 94%   Weight: 68.8 kg (151 lb 9.6 oz)   Height: 1.727 m (5' 8\")       Physical Exam:    Constitutional: Vital signs reviewed. Appears well-developed and well-nourished. Hoarse. Fatigued. No acute distress.   Eyes: Sclera white, conjunctivae clear.   ENT: External ears normal. External auditory canals normal without discharge. TMs translucent and non-bulging. Hearing normal. Nasal mucosa erythematous. Lips/teeth are normal. Oral mucosa pink and moist. Posterior pharynx: WNL.  Neck: Neck supple.   Cardiovascular: Regular rate and rhythm. No murmur.  Pulmonary/Chest: Respirations non-labored. Clear to auscultation bilaterally.  Lymph: Cervical nodes without tenderness or enlargement.  Musculoskeletal: Normal gait. Normal range of motion. No muscular " atrophy or weakness.  Neurological: Alert and oriented to person, place, and time. Muscle tone normal. Coordination normal.   Skin: No rashes or lesions. Warm, dry, normal turgor.  Psychiatric: Normal mood and affect. Behavior is normal. Judgment and thought content normal.       Assessment / Plan:    1. Acute respiratory infection  - amoxicillin (AMOXIL) 875 MG tablet; 1 TAB BY MOUTH TWICE A DAY X 7-10 DAYS.  Dispense: 20 Tab; Refill: 0      Discussed with her DDX, management options, and risks, benefits, and alternatives to treatment plan agreed upon.    May use OTC meds for symptoms prn.    Agreeable to medication prescribed. Since she already took one dose of previously rx'd Amoxil (I suspect is 500 mg), she will take only one 875 mg later today and started BID dosing tomorrow.    Discussed expected course of duration, time for improvement, and to seek follow-up in Emergency Room, urgent care, or with PCP if getting worse at any time or not improving within expected time frame.

## 2019-06-17 ENCOUNTER — TELEPHONE (OUTPATIENT)
Dept: DERMATOLOGY | Facility: IMAGING CENTER | Age: 59
End: 2019-06-17

## 2020-01-13 ENCOUNTER — OFFICE VISIT (OUTPATIENT)
Dept: URGENT CARE | Facility: PHYSICIAN GROUP | Age: 60
End: 2020-01-13
Payer: COMMERCIAL

## 2020-01-13 VITALS
WEIGHT: 156 LBS | DIASTOLIC BLOOD PRESSURE: 90 MMHG | OXYGEN SATURATION: 96 % | HEART RATE: 76 BPM | SYSTOLIC BLOOD PRESSURE: 132 MMHG | BODY MASS INDEX: 23.72 KG/M2 | RESPIRATION RATE: 14 BRPM | TEMPERATURE: 97.9 F

## 2020-01-13 DIAGNOSIS — J01.40 ACUTE NON-RECURRENT PANSINUSITIS: ICD-10-CM

## 2020-01-13 PROCEDURE — 99214 OFFICE O/P EST MOD 30 MIN: CPT | Performed by: PHYSICIAN ASSISTANT

## 2020-01-13 RX ORDER — AMOXICILLIN AND CLAVULANATE POTASSIUM 875; 125 MG/1; MG/1
1 TABLET, FILM COATED ORAL 2 TIMES DAILY
Qty: 14 TAB | Refills: 0 | Status: SHIPPED | OUTPATIENT
Start: 2020-01-13 | End: 2020-01-20

## 2020-01-13 ASSESSMENT — ENCOUNTER SYMPTOMS
ABDOMINAL PAIN: 0
DIARRHEA: 0
COUGH: 1
RHINORRHEA: 1
FEVER: 0
SHORTNESS OF BREATH: 1
VOMITING: 0
SORE THROAT: 1
MYALGIAS: 0
WHEEZING: 0
CHILLS: 1
NAUSEA: 0

## 2020-01-13 NOTE — PROGRESS NOTES
Subjective:      Mariela Machado is a 59 y.o. female who presents with Laryngitis and Cough (almost 3wks )            Cough   This is a new problem. Episode onset: 3 weeks ago. The problem has been gradually worsening. The cough is non-productive. Associated symptoms include chills, ear congestion, nasal congestion, postnasal drip, rhinorrhea, a sore throat and shortness of breath. Pertinent negatives include no chest pain, ear pain, fever, myalgias, rash or wheezing. Associated symptoms comments: Chest tightness.   Light headedness.   . She has tried OTC cough suppressant (Vicks cold and cough. Tried left over amoxicililn x 4 pills last few days.) for the symptoms. The treatment provided moderate relief.   Mild cough. Mostly concerned about her sinus pain.     Review of Systems   Constitutional: Positive for chills. Negative for fever.   HENT: Positive for postnasal drip, rhinorrhea and sore throat. Negative for ear pain.    Respiratory: Positive for cough and shortness of breath. Negative for wheezing.    Cardiovascular: Negative for chest pain.   Gastrointestinal: Negative for abdominal pain, diarrhea, nausea and vomiting.   Genitourinary: Negative.    Musculoskeletal: Negative for myalgias.   Skin: Negative for rash.          Objective:     /90   Pulse 76   Temp 36.6 °C (97.9 °F) (Temporal)   Resp 14   Wt 70.8 kg (156 lb)   SpO2 96%   BMI 23.72 kg/m²      Physical Exam  Vitals signs reviewed.   HENT:      Right Ear: Tympanic membrane is erythematous.      Left Ear: Tympanic membrane normal.      Nose: Mucosal edema and rhinorrhea present.      Right Sinus: Maxillary sinus tenderness and frontal sinus tenderness present.      Left Sinus: Maxillary sinus tenderness and frontal sinus tenderness present.      Mouth/Throat:      Pharynx: Uvula midline. Posterior oropharyngeal erythema present. No pharyngeal swelling, oropharyngeal exudate or uvula swelling.      Tonsils: No tonsillar exudate or tonsillar  abscesses.   Eyes:      Pupils: Pupils are equal, round, and reactive to light.   Cardiovascular:      Rate and Rhythm: Normal rate and regular rhythm.      Heart sounds: Normal heart sounds.   Pulmonary:      Effort: Pulmonary effort is normal. No respiratory distress.      Breath sounds: Normal breath sounds. No wheezing, rhonchi or rales.   Skin:     General: Skin is warm and dry.   Neurological:      General: No focal deficit present.      Mental Status: She is oriented to person, place, and time.   Psychiatric:         Mood and Affect: Mood normal.         Behavior: Behavior normal.         Thought Content: Thought content normal.       Past Medical History:   Diagnosis Date   • Menopause    • Vertigo       Past Surgical History:   Procedure Laterality Date   • HYSTERECTOMY LAPAROSCOPY        Social History     Socioeconomic History   • Marital status:      Spouse name: Not on file   • Number of children: Not on file   • Years of education: Not on file   • Highest education level: Not on file   Occupational History   • Not on file   Social Needs   • Financial resource strain: Not on file   • Food insecurity:     Worry: Not on file     Inability: Not on file   • Transportation needs:     Medical: Not on file     Non-medical: Not on file   Tobacco Use   • Smoking status: Never Smoker   • Smokeless tobacco: Never Used   Substance and Sexual Activity   • Alcohol use: Yes     Alcohol/week: 1.2 oz     Types: 2 Cans of beer per week   • Drug use: No   • Sexual activity: Not on file   Lifestyle   • Physical activity:     Days per week: Not on file     Minutes per session: Not on file   • Stress: Not on file   Relationships   • Social connections:     Talks on phone: Not on file     Gets together: Not on file     Attends [employer] service: Not on file     Active member of club or organization: Not on file     Attends meetings of clubs or organizations: Not on file     Relationship status: Not on file   • Intimate  partner violence:     Fear of current or ex partner: Not on file     Emotionally abused: Not on file     Physically abused: Not on file     Forced sexual activity: Not on file   Other Topics Concern   • Not on file   Social History Narrative   • Not on file    Patient has no known allergies.         Assessment/Plan:   1. Acute non-recurrent pansinusitis    - amoxicillin-clavulanate (AUGMENTIN) 875-125 MG Tab; Take 1 Tab by mouth 2 times a day for 7 days.  Dispense: 14 Tab; Refill: 0    Discussed patient's signs and symptoms are consistent with pansinusitis.  Since patient has had symptoms for the past 3 weeks and also worsening sinus pressure and pain, will treat with Augmentin.  Encourage plenty of fluids, Mucinex, ibuprofen/Tylenol for pain or fevers, nasal saline washes.  Advised to return to the urgent care or present to the ED if any worsening symptoms over the next few days, fever, chills, severe headaches or any other concerns.   Follow up with PCP as needed.   Patient understood and agreed to plan of care.     Please note that this dictation was created using voice recognition software. I have made every reasonable attempt to correct obvious errors, but I expect that there are errors of grammar and possibly content that I did not discover before finalizing the note.

## 2020-08-04 ENCOUNTER — OFFICE VISIT (OUTPATIENT)
Dept: MEDICAL GROUP | Facility: PHYSICIAN GROUP | Age: 60
End: 2020-08-04
Payer: COMMERCIAL

## 2020-08-04 ENCOUNTER — HOSPITAL ENCOUNTER (OUTPATIENT)
Dept: LAB | Facility: MEDICAL CENTER | Age: 60
End: 2020-08-04
Attending: NURSE PRACTITIONER
Payer: COMMERCIAL

## 2020-08-04 VITALS
SYSTOLIC BLOOD PRESSURE: 134 MMHG | WEIGHT: 160 LBS | OXYGEN SATURATION: 98 % | HEIGHT: 68 IN | BODY MASS INDEX: 24.25 KG/M2 | RESPIRATION RATE: 14 BRPM | TEMPERATURE: 97.6 F | DIASTOLIC BLOOD PRESSURE: 80 MMHG | HEART RATE: 80 BPM

## 2020-08-04 DIAGNOSIS — L65.9 HAIR LOSS: ICD-10-CM

## 2020-08-04 DIAGNOSIS — R63.5 WEIGHT GAIN: ICD-10-CM

## 2020-08-04 DIAGNOSIS — Z11.59 NEED FOR HEPATITIS C SCREENING TEST: ICD-10-CM

## 2020-08-04 DIAGNOSIS — Z13.6 SCREENING FOR CARDIOVASCULAR CONDITION: ICD-10-CM

## 2020-08-04 DIAGNOSIS — R68.82 LOW LIBIDO: ICD-10-CM

## 2020-08-04 DIAGNOSIS — Z12.11 SCREENING FOR COLORECTAL CANCER: ICD-10-CM

## 2020-08-04 DIAGNOSIS — Z12.31 ENCOUNTER FOR SCREENING MAMMOGRAM FOR BREAST CANCER: ICD-10-CM

## 2020-08-04 DIAGNOSIS — R53.83 FATIGUE, UNSPECIFIED TYPE: ICD-10-CM

## 2020-08-04 DIAGNOSIS — N95.1 HOT FLASHES DUE TO MENOPAUSE: ICD-10-CM

## 2020-08-04 DIAGNOSIS — Z12.12 SCREENING FOR COLORECTAL CANCER: ICD-10-CM

## 2020-08-04 DIAGNOSIS — F41.9 ANXIETY: ICD-10-CM

## 2020-08-04 DIAGNOSIS — R14.0 ABDOMINAL BLOATING: ICD-10-CM

## 2020-08-04 DIAGNOSIS — Z23 NEED FOR VACCINATION: ICD-10-CM

## 2020-08-04 LAB
25(OH)D3 SERPL-MCNC: 34 NG/ML (ref 30–100)
ALBUMIN SERPL BCP-MCNC: 4.7 G/DL (ref 3.2–4.9)
ALBUMIN/GLOB SERPL: 1.9 G/DL
ALP SERPL-CCNC: 60 U/L (ref 30–99)
ALT SERPL-CCNC: 20 U/L (ref 2–50)
ANION GAP SERPL CALC-SCNC: 13 MMOL/L (ref 7–16)
AST SERPL-CCNC: 16 U/L (ref 12–45)
BASOPHILS # BLD AUTO: 0.4 % (ref 0–1.8)
BASOPHILS # BLD: 0.03 K/UL (ref 0–0.12)
BILIRUB SERPL-MCNC: 0.9 MG/DL (ref 0.1–1.5)
BUN SERPL-MCNC: 8 MG/DL (ref 8–22)
CALCIUM SERPL-MCNC: 9 MG/DL (ref 8.5–10.5)
CHLORIDE SERPL-SCNC: 101 MMOL/L (ref 96–112)
CHOLEST SERPL-MCNC: 264 MG/DL (ref 100–199)
CO2 SERPL-SCNC: 24 MMOL/L (ref 20–33)
CREAT SERPL-MCNC: 0.72 MG/DL (ref 0.5–1.4)
EOSINOPHIL # BLD AUTO: 0.16 K/UL (ref 0–0.51)
EOSINOPHIL NFR BLD: 2 % (ref 0–6.9)
ERYTHROCYTE [DISTWIDTH] IN BLOOD BY AUTOMATED COUNT: 41.1 FL (ref 35.9–50)
FASTING STATUS PATIENT QL REPORTED: NORMAL
FERRITIN SERPL-MCNC: 138 NG/ML (ref 10–291)
GLOBULIN SER CALC-MCNC: 2.5 G/DL (ref 1.9–3.5)
GLUCOSE SERPL-MCNC: 82 MG/DL (ref 65–99)
HCT VFR BLD AUTO: 47.2 % (ref 37–47)
HCV AB SER QL: NORMAL
HDLC SERPL-MCNC: 76 MG/DL
HGB BLD-MCNC: 15.5 G/DL (ref 12–16)
IMM GRANULOCYTES # BLD AUTO: 0.01 K/UL (ref 0–0.11)
IMM GRANULOCYTES NFR BLD AUTO: 0.1 % (ref 0–0.9)
LDLC SERPL CALC-MCNC: 155 MG/DL
LYMPHOCYTES # BLD AUTO: 3.56 K/UL (ref 1–4.8)
LYMPHOCYTES NFR BLD: 44.8 % (ref 22–41)
MCH RBC QN AUTO: 30.5 PG (ref 27–33)
MCHC RBC AUTO-ENTMCNC: 32.8 G/DL (ref 33.6–35)
MCV RBC AUTO: 92.7 FL (ref 81.4–97.8)
MONOCYTES # BLD AUTO: 0.74 K/UL (ref 0–0.85)
MONOCYTES NFR BLD AUTO: 9.3 % (ref 0–13.4)
NEUTROPHILS # BLD AUTO: 3.44 K/UL (ref 2–7.15)
NEUTROPHILS NFR BLD: 43.4 % (ref 44–72)
NRBC # BLD AUTO: 0 K/UL
NRBC BLD-RTO: 0 /100 WBC
PLATELET # BLD AUTO: 309 K/UL (ref 164–446)
PMV BLD AUTO: 10.1 FL (ref 9–12.9)
POTASSIUM SERPL-SCNC: 3.8 MMOL/L (ref 3.6–5.5)
PROT SERPL-MCNC: 7.2 G/DL (ref 6–8.2)
RBC # BLD AUTO: 5.09 M/UL (ref 4.2–5.4)
SODIUM SERPL-SCNC: 138 MMOL/L (ref 135–145)
TRIGL SERPL-MCNC: 163 MG/DL (ref 0–149)
TSH SERPL DL<=0.005 MIU/L-ACNC: 2.26 UIU/ML (ref 0.38–5.33)
VIT B12 SERPL-MCNC: 557 PG/ML (ref 211–911)
WBC # BLD AUTO: 7.9 K/UL (ref 4.8–10.8)

## 2020-08-04 PROCEDURE — 82728 ASSAY OF FERRITIN: CPT

## 2020-08-04 PROCEDURE — 80053 COMPREHEN METABOLIC PANEL: CPT

## 2020-08-04 PROCEDURE — 80061 LIPID PANEL: CPT

## 2020-08-04 PROCEDURE — 99214 OFFICE O/P EST MOD 30 MIN: CPT | Performed by: NURSE PRACTITIONER

## 2020-08-04 PROCEDURE — 85025 COMPLETE CBC W/AUTO DIFF WBC: CPT

## 2020-08-04 PROCEDURE — 84443 ASSAY THYROID STIM HORMONE: CPT

## 2020-08-04 PROCEDURE — G0472 HEP C SCREEN HIGH RISK/OTHER: HCPCS

## 2020-08-04 PROCEDURE — 82607 VITAMIN B-12: CPT

## 2020-08-04 PROCEDURE — 36415 COLL VENOUS BLD VENIPUNCTURE: CPT

## 2020-08-04 PROCEDURE — 82306 VITAMIN D 25 HYDROXY: CPT

## 2020-08-04 RX ORDER — LORAZEPAM 0.5 MG/1
0.5 TABLET ORAL 2 TIMES DAILY PRN
Qty: 30 TAB | Refills: 0 | Status: SHIPPED | OUTPATIENT
Start: 2020-08-04 | End: 2020-09-03

## 2020-08-04 RX ORDER — ESTRADIOL 1 MG/1
TABLET ORAL
Qty: 90 TAB | Refills: 3 | Status: SHIPPED | OUTPATIENT
Start: 2020-08-04 | End: 2021-12-22

## 2020-08-04 SDOH — HEALTH STABILITY: MENTAL HEALTH: HOW OFTEN DO YOU HAVE A DRINK CONTAINING ALCOHOL?: 2-3 TIMES A WEEK

## 2020-08-04 ASSESSMENT — PATIENT HEALTH QUESTIONNAIRE - PHQ9: CLINICAL INTERPRETATION OF PHQ2 SCORE: 0

## 2020-08-04 ASSESSMENT — FIBROSIS 4 INDEX: FIB4 SCORE: 0.7

## 2020-08-04 NOTE — PROGRESS NOTES
Chief Complaint   Patient presents with   • Requesting Labs   • Tired   • Hair Loss         This is a 60 y.o.female patient that presents today with the following: Discuss multiple symptoms, labs    Hot flashes due to menopause  Patient is postmenopausal  For this she takes estradiol 1 mg daily  She does need refills  She is due for mammogram, this was previously scheduled, she is strongly encouraged to make sure she schedule this especially in the setting of hormone replacement therapy  Does understand the risks associated with HRT    Anxiety  Chronic and stable  Well-controlled with lifestyle modifications however she does take Lorazepam 0.5 mg very sparingly, this has not been filled since 2018  She is requesting refill, this is reasonable  She does understand the risks associated with chronic benzodiazepine use    Weight gain  Patient very concerned regarding weight gain albeit she is still within a normal BMI range  States this is not common for her and she does have a strong family history of thyroid disorder  She is also reporting symptoms of hair loss, dry skin and constipation  She also reports sluggishness and fatigue  She has not had labs done in well over a year, multiple labs have been ordered, she will have done today as she is fasting    Abdominal bloating  Patient has persistent abdominal bloating  States most always associated with food but not any particular food can be anything she eats  Does deny associated heartburn, vomiting, nausea or diarrhea  Not currently taking a PPI  She does have a history of abdominal surgery in the setting of a hiatal hernia    Hair loss  See additional notes      No visits with results within 1 Month(s) from this visit.   Latest known visit with results is:   Hospital Outpatient Visit on 01/08/2019   Component Date Value   • WBC 01/08/2019 7.1    • RBC 01/08/2019 5.13    • Hemoglobin 01/08/2019 15.7    • Hematocrit 01/08/2019 47.3*   • MCV 01/08/2019 92.2    • MCH  01/08/2019 30.6    • MCHC 01/08/2019 33.2*   • RDW 01/08/2019 40.4    • Platelet Count 01/08/2019 358    • MPV 01/08/2019 10.0    • Neutrophils-Polys 01/08/2019 54.90    • Lymphocytes 01/08/2019 33.90    • Monocytes 01/08/2019 8.10    • Eosinophils 01/08/2019 2.20    • Basophils 01/08/2019 0.60    • Immature Granulocytes 01/08/2019 0.30    • Nucleated RBC 01/08/2019 0.00    • Neutrophils (Absolute) 01/08/2019 3.91    • Lymphs (Absolute) 01/08/2019 2.42    • Monos (Absolute) 01/08/2019 0.58    • Eos (Absolute) 01/08/2019 0.16    • Baso (Absolute) 01/08/2019 0.04    • Immature Granulocytes (a* 01/08/2019 0.02    • NRBC (Absolute) 01/08/2019 0.00    • GFR If  01/08/2019 >60    • GFR If Non  Ameri* 01/08/2019 >60    • Sodium 01/08/2019 141    • Potassium 01/08/2019 3.6    • Chloride 01/08/2019 102    • Co2 01/08/2019 29    • Anion Gap 01/08/2019 10.0    • Glucose 01/08/2019 71    • Bun 01/08/2019 14    • Creatinine 01/08/2019 0.83    • Calcium 01/08/2019 9.5    • AST(SGOT) 01/08/2019 15    • ALT(SGPT) 01/08/2019 13    • Alkaline Phosphatase 01/08/2019 59    • Total Bilirubin 01/08/2019 0.9    • Albumin 01/08/2019 4.5    • Total Protein 01/08/2019 7.5    • Globulin 01/08/2019 3.0    • A-G Ratio 01/08/2019 1.5    • TSH 01/08/2019 1.280    • Free T-4 01/08/2019 0.84    • Sed Rate Westergren 01/08/2019 9              Past Medical History:   Diagnosis Date   • Menopause    • Vertigo        Past Surgical History:   Procedure Laterality Date   • HYSTERECTOMY LAPAROSCOPY         No family history on file.    Patient has no known allergies.    Current Outpatient Medications Ordered in Epic   Medication Sig Dispense Refill   • estradiol (ESTRACE) 1 MG Tab TAKE 1 TABLET BY MOUTH ONCE DAILY 90 Tab 3   • LORazepam (ATIVAN) 0.5 MG Tab Take 1 Tab by mouth 2 times a day as needed for Anxiety for up to 30 days. 30 Tab 0   • ST JOHNS WORT PO Take 1 Cap by mouth every day.     • Omega-3 Fatty Acids (FISH OIL) 1000  "MG Cap capsule Take 1,000 mg by mouth every day.     • Biotin 1000 MCG Tab Take 1,000 mcg by mouth every day.       No current [facility]-ordered facility-administered medications on file.        Constitutional ROS: Positive for fatigue, weight gain  Pulmonary ROS: No chronic cough, sputum, or hemoptysis, No shortness of breath, No recent change in breathing  Cardiovascular ROS: No chest pain  Gastrointestinal ROS: Positive for abdominal bloating  Musculoskeletal/Extremities ROS: No clubbing, No peripheral edema, No pain, redness or swelling on the joints  Skin/Integumentary ROS: Positive for hair loss  Neurologic ROS: Normal development, No seizures, No weakness, Positive for paresthesias to hands and feet  Psychiatric ROS: Positive for anxiety    ROS: Positive per HPI    Physical exam:  /80   Pulse 80   Temp 36.4 °C (97.6 °F) (Temporal)   Resp 14   Ht 1.727 m (5' 8\")   Wt 72.6 kg (160 lb)   SpO2 98%   BMI 24.33 kg/m²   General Appearance: Pleasant middle-aged older female, alert, no distress, well-nourished, well-groomed  Skin: Skin color, texture, turgor normal. No rashes or lesions.  Lungs: negative findings: normal respiratory rate and rhythm, lungs clear to auscultation  Heart: negative. RRR without murmur, gallop, or rubs.  No ectopy.  Abdomen: positive findings:  tenderness mild bilateral upper quadrant as well as epigastric regions  Musculoskeletal: negative findings: no evidence of joint instability, no evidence of muscle atrophy, no deformities present  Neurologic: intact, CN II through XII grossly intact    Medical decision making/discussion:     Labs today    Mammogram and colonoscopy    Immunizations     Follow up in 2-3 weeks     Mariela was seen today for requesting labs, tired and hair loss.    Diagnoses and all orders for this visit:    Weight gain  -     TSH WITH REFLEX TO FT4; Future    Abdominal bloating    Hair loss  -     TSH WITH REFLEX TO FT4; Future    Fatigue, unspecified type  -  "    CBC WITH DIFFERENTIAL; Future  -     TSH WITH REFLEX TO FT4; Future  -     FERRITIN; Future  -     VITAMIN D,25 HYDROXY; Future  -     VITAMIN B12; Future    Low libido  -     estradiol (ESTRACE) 1 MG Tab; TAKE 1 TABLET BY MOUTH ONCE DAILY    Hot flashes due to menopause  -     estradiol (ESTRACE) 1 MG Tab; TAKE 1 TABLET BY MOUTH ONCE DAILY    Anxiety  -     LORazepam (ATIVAN) 0.5 MG Tab; Take 1 Tab by mouth 2 times a day as needed for Anxiety for up to 30 days.    Need for hepatitis C screening test  -     HCV Scrn ( [id number] 1xLife); Future    Need for vaccination  -     Zoster Vac Recomb Adjuvanted (SHINGRIX) 50 MCG/0.5ML Recon Susp; 0.5 mL by Intramuscular route Once for 1 dose.  -     Tdap Vaccine =>6YO IM    Encounter for screening mammogram for breast cancer  -     MA-SCREENING MAMMO BILAT W/CAD; Future    Screening for colorectal cancer  -     REFERRAL TO GI FOR COLONOSCOPY    Screening for cardiovascular condition  -     CBC WITH DIFFERENTIAL; Future  -     Comp Metabolic Panel; Future  -     Lipid Profile; Future        Return in about 2 weeks (around 2020) for Discuss Labs.        Please note that this dictation was created using voice recognition software. I have made every reasonable attempt to correct obvious errors, but I expect that there are errors of grammar and possibly content that I did not discover before finalizing the note.

## 2020-08-05 PROBLEM — R53.83 FATIGUE: Status: ACTIVE | Noted: 2020-08-05

## 2020-08-05 NOTE — ASSESSMENT & PLAN NOTE
Patient very concerned regarding weight gain albeit she is still within a normal BMI range  States this is not common for her and she does have a strong family history of thyroid disorder  She is also reporting symptoms of hair loss, dry skin and constipation  She also reports sluggishness and fatigue  She has not had labs done in well over a year, multiple labs have been ordered, she will have done today as she is fasting

## 2020-08-05 NOTE — ASSESSMENT & PLAN NOTE
Patient has persistent abdominal bloating  States most always associated with food but not any particular food can be anything she eats  Does deny associated heartburn, vomiting, nausea or diarrhea  Not currently taking a PPI  She does have a history of abdominal surgery in the setting of a hiatal hernia

## 2020-08-05 NOTE — ASSESSMENT & PLAN NOTE
Chronic and stable  Well-controlled with lifestyle modifications however she does take Lorazepam 0.5 mg very sparingly, this has not been filled since 2018  She is requesting refill, this is reasonable  She does understand the risks associated with chronic benzodiazepine use

## 2020-08-05 NOTE — ASSESSMENT & PLAN NOTE
Patient is postmenopausal  For this she takes estradiol 1 mg daily  She does need refills  She is due for mammogram, this was previously scheduled, she is strongly encouraged to make sure she schedule this especially in the setting of hormone replacement therapy  Does understand the risks associated with HRT

## 2020-08-18 ENCOUNTER — OFFICE VISIT (OUTPATIENT)
Dept: MEDICAL GROUP | Facility: PHYSICIAN GROUP | Age: 60
End: 2020-08-18
Payer: COMMERCIAL

## 2020-08-18 VITALS
BODY MASS INDEX: 28.73 KG/M2 | TEMPERATURE: 97.5 F | RESPIRATION RATE: 14 BRPM | DIASTOLIC BLOOD PRESSURE: 82 MMHG | WEIGHT: 189.6 LBS | HEART RATE: 91 BPM | HEIGHT: 68 IN | SYSTOLIC BLOOD PRESSURE: 122 MMHG | OXYGEN SATURATION: 97 %

## 2020-08-18 DIAGNOSIS — R14.0 ABDOMINAL BLOATING: ICD-10-CM

## 2020-08-18 DIAGNOSIS — R63.5 WEIGHT GAIN: ICD-10-CM

## 2020-08-18 DIAGNOSIS — E78.2 MIXED HYPERLIPIDEMIA: ICD-10-CM

## 2020-08-18 DIAGNOSIS — K21.9 GASTROESOPHAGEAL REFLUX DISEASE, ESOPHAGITIS PRESENCE NOT SPECIFIED: ICD-10-CM

## 2020-08-18 PROCEDURE — 99214 OFFICE O/P EST MOD 30 MIN: CPT | Performed by: NURSE PRACTITIONER

## 2020-08-18 RX ORDER — OMEPRAZOLE 20 MG/1
20 CAPSULE, DELAYED RELEASE ORAL DAILY
Qty: 90 CAP | Refills: 1 | Status: SHIPPED | OUTPATIENT
Start: 2020-08-18 | End: 2022-08-15

## 2020-08-18 ASSESSMENT — FIBROSIS 4 INDEX: FIB4 SCORE: 0.69

## 2020-08-18 NOTE — ASSESSMENT & PLAN NOTE
The 10-year ASCVD risk score (Ping CAMEJO Jr., et al., 2013) is: 3%  LDL elevated at 155  Not interested at this time and starting statin but will work on lifestyle modifications  We will plan on repeating labs again in 6 months

## 2020-08-18 NOTE — ASSESSMENT & PLAN NOTE
Patient here for follow-up, to go over multiple labs ordered for symptoms reported at her last visit to include hair loss, weight gain and abdominal bloating  Discussed with patient that essentially all of her labs well within normal limits except for her cholesterol (see additional notes)  She does find that she has abdominal bloating with certain foods, all consistent with aggravating foods with GERD  She does agree to starting PPI, she will start omeprazole 20 mg daily, advised to take on an empty stomach first thing in the morning before first meal of the day  She does understand that she is to follow-up with me if her symptoms not improve with medication and avoiding aggravating foods and activities

## 2020-08-18 NOTE — ASSESSMENT & PLAN NOTE
Patient continues to be concerned about weight gain, recent labs well within normal limits, no evidence of thyroid disorder  Discussed weight loss strategies

## 2020-08-18 NOTE — PATIENT INSTRUCTIONS
Will have you start Prilosec, 20 mg daily on empty stomach    Let me know how you are doing in 3-4 weeks       [facility]   [address] [place] [id number]  Phone: [phone]  Fax: [phone]      Gastroesophageal Reflux Disease, Adult  Gastroesophageal reflux (KAYLA) happens when acid from the stomach flows up into the tube that connects the mouth and the stomach (esophagus). Normally, food travels down the esophagus and stays in the stomach to be digested. With KAYLA, food and stomach acid sometimes move back up into the esophagus. You may have a disease called gastroesophageal reflux disease (GERD) if the reflux:  · Happens often.  · Causes frequent or very bad symptoms.  · Causes problems such as damage to the esophagus.  When this happens, the esophagus becomes sore and swollen (inflamed). Over time, GERD can make small holes (ulcers) in the lining of the esophagus.  What are the causes?  This condition is caused by a problem with the muscle between the esophagus and the stomach. When this muscle is weak or not normal, it does not close properly to keep food and acid from coming back up from the stomach. The muscle can be weak because of:  · Tobacco use.  · Pregnancy.  · Having a certain type of hernia (hiatal hernia).  · Alcohol use.  · Certain foods and drinks, such as coffee, chocolate, onions, and peppermint.  What increases the risk?  You are more likely to develop this condition if you:  · Are overweight.  · Have a disease that affects your connective tissue.  · Use NSAID medicines.  What are the signs or symptoms?  Symptoms of this condition include:  · Heartburn.  · Difficult or painful swallowing.  · The feeling of having a lump in the throat.  · A bitter taste in the mouth.  · Bad breath.  · Having a lot of saliva.  · Having an upset or bloated stomach.  · Belching.  · Chest pain. Different conditions can cause chest pain. Make sure you see your doctor if you have chest  pain.  · Shortness of breath or noisy breathing (wheezing).  · Ongoing (chronic) cough or a cough at night.  · Wearing away of the surface of teeth (tooth enamel).  · Weight loss.  How is this treated?  Treatment will depend on how bad your symptoms are. Your doctor may suggest:  · Changes to your diet.  · Medicine.  · Surgery.  Follow these instructions at home:  Eating and drinking    · Follow a diet as told by your doctor. You may need to avoid foods and drinks such as:  ? Coffee and tea (with or without caffeine).  ? Drinks that contain alcohol.  ? Energy drinks and sports drinks.  ? Bubbly (carbonated) drinks or sodas.  ? Chocolate and cocoa.  ? Peppermint and mint flavorings.  ? Garlic and onions.  ? Horseradish.  ? Spicy and acidic foods. These include peppers, chili powder, torres powder, vinegar, hot sauces, and BBQ sauce.  ? Citrus fruit juices and citrus fruits, such as oranges, florence, and limes.  ? Tomato-based foods. These include red sauce, chili, salsa, and pizza with red sauce.  ? Fried and fatty foods. These include donuts, french fries, potato chips, and high-fat dressings.  ? High-fat meats. These include hot dogs, rib eye steak, sausage, ham, and jimenes.  ? High-fat dairy items, such as whole milk, butter, and cream cheese.  · Eat small meals often. Avoid eating large meals.  · Avoid drinking large amounts of liquid with your meals.  · Avoid eating meals during the 2-3 hours before bedtime.  · Avoid lying down right after you eat.  · Do not exercise right after you eat.  Lifestyle    · Do not use any products that contain nicotine or tobacco. These include cigarettes, e-cigarettes, and chewing tobacco. If you need help quitting, ask your doctor.  · Try to lower your stress. If you need help doing this, ask your doctor.  · If you are overweight, lose an amount of weight that is healthy for you. Ask your doctor about a safe weight loss goal.  General instructions  · Pay attention to any changes in  your symptoms.  · Take over-the-counter and prescription medicines only as told by your doctor. Do not take aspirin, ibuprofen, or other NSAIDs unless your doctor says it is okay.  · Wear loose clothes. Do not wear anything tight around your waist.  · Raise (elevate) the head of your bed about 6 inches (15 cm).  · Avoid bending over if this makes your symptoms worse.  · Keep all follow-up visits as told by your doctor. This is important.  Contact a doctor if:  · You have new symptoms.  · You lose weight and you do not know why.  · You have trouble swallowing or it hurts to swallow.  · You have wheezing or a cough that keeps happening.  · Your symptoms do not get better with treatment.  · You have a hoarse voice.  Get help right away if:  · You have pain in your arms, neck, jaw, teeth, or back.  · You feel sweaty, dizzy, or light-headed.  · You have chest pain or shortness of breath.  · You throw up (vomit) and your throw-up looks like blood or coffee grounds.  · You pass out (faint).  · Your poop (stool) is bloody or black.  · You cannot swallow, drink, or eat.  Summary  · If a person has gastroesophageal reflux disease (GERD), food and stomach acid move back up into the esophagus and cause symptoms or problems such as damage to the esophagus.  · Treatment will depend on how bad your symptoms are.  · Follow a diet as told by your doctor.  · Take all medicines only as told by your doctor.  This information is not intended to replace advice given to you by your health care provider. Make sure you discuss any questions you have with your health care provider.  Document Released: 06/05/2009 Document Revised: 06/26/2019 Document Reviewed: 06/26/2019  Elsevier Patient Education © 2020 [employer]

## 2020-08-18 NOTE — PROGRESS NOTES
Chief Complaint   Patient presents with   • Follow-Up     follow up on lab work.          This is a 60 y.o.female patient that presents today with the following: follow up, review labs    Mixed hyperlipidemia  The 10-year ASCVD risk score (Omahanoel CAMEJO Jr., et al., 2013) is: 3%  LDL elevated at 155  Not interested at this time and starting statin but will work on lifestyle modifications  We will plan on repeating labs again in 6 months    Abdominal bloating  Patient here for follow-up, to go over multiple labs ordered for symptoms reported at her last visit to include hair loss, weight gain and abdominal bloating  Discussed with patient that essentially all of her labs well within normal limits except for her cholesterol (see additional notes)  She does find that she has abdominal bloating with certain foods, all consistent with aggravating foods with GERD  She does agree to starting PPI, she will start omeprazole 20 mg daily, advised to take on an empty stomach first thing in the morning before first meal of the day  She does understand that she is to follow-up with me if her symptoms not improve with medication and avoiding aggravating foods and activities    Weight gain  Patient continues to be concerned about weight gain, recent labs well within normal limits, no evidence of thyroid disorder  Discussed weight loss strategies      Hospital Outpatient Visit on 08/04/2020   Component Date Value   • Cholesterol,Tot 08/04/2020 264*   • Triglycerides 08/04/2020 163*   • HDL 08/04/2020 76    • LDL 08/04/2020 155*   • Vitamin B12 -True Cobala* 08/04/2020 557    • 25-Hydroxy   Vitamin D 25 08/04/2020 34    • Ferritin 08/04/2020 138.0    • TSH 08/04/2020 2.260    • Sodium 08/04/2020 138    • Potassium 08/04/2020 3.8    • Chloride 08/04/2020 101    • Co2 08/04/2020 24    • Anion Gap 08/04/2020 13.0    • Glucose 08/04/2020 82    • Bun 08/04/2020 8    • Creatinine 08/04/2020 0.72    • Calcium 08/04/2020 9.0    • AST(SGOT)  08/04/2020 16    • ALT(SGPT) 08/04/2020 20    • Alkaline Phosphatase 08/04/2020 60    • Total Bilirubin 08/04/2020 0.9    • Albumin 08/04/2020 4.7    • Total Protein 08/04/2020 7.2    • Globulin 08/04/2020 2.5    • A-G Ratio 08/04/2020 1.9    • WBC 08/04/2020 7.9    • RBC 08/04/2020 5.09    • Hemoglobin 08/04/2020 15.5    • Hematocrit 08/04/2020 47.2*   • MCV 08/04/2020 92.7    • MCH 08/04/2020 30.5    • MCHC 08/04/2020 32.8*   • RDW 08/04/2020 41.1    • Platelet Count 08/04/2020 309    • MPV 08/04/2020 10.1    • Neutrophils-Polys 08/04/2020 43.40*   • Lymphocytes 08/04/2020 44.80*   • Monocytes 08/04/2020 9.30    • Eosinophils 08/04/2020 2.00    • Basophils 08/04/2020 0.40    • Immature Granulocytes 08/04/2020 0.10    • Nucleated RBC 08/04/2020 0.00    • Neutrophils (Absolute) 08/04/2020 3.44    • Lymphs (Absolute) 08/04/2020 3.56    • Monos (Absolute) 08/04/2020 0.74    • Eos (Absolute) 08/04/2020 0.16    • Baso (Absolute) 08/04/2020 0.03    • Immature Granulocytes (a* 08/04/2020 0.01    • NRBC (Absolute) 08/04/2020 0.00    • Hepatitis C Antibody 08/04/2020 Non-Reactive    • Fasting Status 08/04/2020 Fasting    • GFR If  08/04/2020 >60    • GFR If Non  Ameri* 08/04/2020 >60          clinical course has been stable    Past Medical History:   Diagnosis Date   • Menopause    • Vertigo        Past Surgical History:   Procedure Laterality Date   • HYSTERECTOMY LAPAROSCOPY         No family history on file.    Patient has no known allergies.    Current Outpatient Medications Ordered in Epic   Medication Sig Dispense Refill   • B Complex Vitamins (B COMPLEX 1 PO) Take  by mouth.     • Probiotic Product (PROBIOTIC-10 PO) Take  by mouth.     • omeprazole (PRILOSEC) 20 MG delayed-release capsule Take 1 Cap by mouth every day. 90 Cap 1   • estradiol (ESTRACE) 1 MG Tab TAKE 1 TABLET BY MOUTH ONCE DAILY 90 Tab 3   • LORazepam (ATIVAN) 0.5 MG Tab Take 1 Tab by mouth 2 times a day as needed for Anxiety for  "up to 30 days. 30 Tab 0   • ST JOHNS WORT PO Take 1 Cap by mouth every day.     • Omega-3 Fatty Acids (FISH OIL) 1000 MG Cap capsule Take 1,000 mg by mouth every day.     • Biotin 1000 MCG Tab Take 1,000 mcg by mouth every day.       No current [facility]-ordered facility-administered medications on file.        Constitutional ROS: No unexpected change in weight, No weakness, No unexplained fevers, sweats, or chills  Pulmonary ROS: No chronic cough, sputum, or hemoptysis, No shortness of breath, No recent change in breathing  Cardiovascular ROS: No chest pain, No edema, No palpitations, Positive for HLD  Gastrointestinal ROS: positive per HPI  Musculoskeletal/Extremities ROS: No clubbing, No peripheral edema, No pain, redness or swelling on the joints  Neurologic ROS: Normal development, No seizures, No weakness      Physical exam:  /82   Pulse 91   Temp 36.4 °C (97.5 °F) (Temporal)   Resp 14   Ht 1.727 m (5' 8\")   Wt 86 kg (189 lb 9.6 oz)   SpO2 97%   BMI 28.83 kg/m²   General Appearance: very pleasant middle aged to older female, alert, no distress, well groomed  Skin: Skin color, texture, turgor normal. No rashes or lesions.  Lungs: negative findings: normal respiratory rate and rhythm, normal effort  Musculoskeletal: negative findings: no evidence of joint instability, strength normal, no evidence of muscle atrophy, no deformities present  Neurologic: intact, CN 2-12 grossly intact    Medical decision making/discussion:     Will have you start Prilosec, 20 mg daily on empty stomach    Let me know how you are doing in 3-4 weeks    Mariela was seen today for follow-up.    Diagnoses and all orders for this visit:    Weight gain    Mixed hyperlipidemia    Gastroesophageal reflux disease, esophagitis presence not specified  -     omeprazole (PRILOSEC) 20 MG delayed-release capsule; Take 1 Cap by mouth every day.    Abdominal bloating        Return in about 6 months (around 2/18/2021) for " Follow-up.        Please note that this dictation was created using voice recognition software. I have made every reasonable attempt to correct obvious errors, but I expect that there are errors of grammar and possibly content that I did not discover before finalizing the note.

## 2021-08-24 ENCOUNTER — OFFICE VISIT (OUTPATIENT)
Dept: URGENT CARE | Facility: PHYSICIAN GROUP | Age: 61
End: 2021-08-24
Payer: COMMERCIAL

## 2021-08-24 ENCOUNTER — APPOINTMENT (OUTPATIENT)
Dept: RADIOLOGY | Facility: IMAGING CENTER | Age: 61
End: 2021-08-24
Attending: NURSE PRACTITIONER
Payer: COMMERCIAL

## 2021-08-24 VITALS
RESPIRATION RATE: 16 BRPM | SYSTOLIC BLOOD PRESSURE: 132 MMHG | OXYGEN SATURATION: 98 % | DIASTOLIC BLOOD PRESSURE: 94 MMHG | HEIGHT: 68 IN | BODY MASS INDEX: 23.64 KG/M2 | WEIGHT: 156 LBS | HEART RATE: 102 BPM | TEMPERATURE: 97.2 F

## 2021-08-24 DIAGNOSIS — G47.01 INSOMNIA DUE TO MEDICAL CONDITION: ICD-10-CM

## 2021-08-24 DIAGNOSIS — M51.36 DDD (DEGENERATIVE DISC DISEASE), LUMBAR: ICD-10-CM

## 2021-08-24 DIAGNOSIS — M54.42 ACUTE MIDLINE LOW BACK PAIN WITH LEFT-SIDED SCIATICA: ICD-10-CM

## 2021-08-24 DIAGNOSIS — R26.89 ANTALGIC GAIT: ICD-10-CM

## 2021-08-24 DIAGNOSIS — M62.838 MUSCLE SPASM: ICD-10-CM

## 2021-08-24 PROCEDURE — 99214 OFFICE O/P EST MOD 30 MIN: CPT | Performed by: NURSE PRACTITIONER

## 2021-08-24 PROCEDURE — 72100 X-RAY EXAM L-S SPINE 2/3 VWS: CPT | Mod: TC,FY | Performed by: NURSE PRACTITIONER

## 2021-08-24 RX ORDER — NAPROXEN 500 MG/1
500 TABLET ORAL 2 TIMES DAILY WITH MEALS
Qty: 60 TABLET | Refills: 0 | Status: SHIPPED | OUTPATIENT
Start: 2021-08-24 | End: 2022-07-19

## 2021-08-24 RX ORDER — METHYLPREDNISOLONE 4 MG/1
TABLET ORAL
Qty: 21 TABLET | Refills: 0 | Status: SHIPPED | OUTPATIENT
Start: 2021-08-24 | End: 2022-07-19

## 2021-08-24 RX ORDER — CYCLOBENZAPRINE HCL 5 MG
5-10 TABLET ORAL 3 TIMES DAILY PRN
Qty: 30 TABLET | Refills: 0 | Status: SHIPPED | OUTPATIENT
Start: 2021-08-24 | End: 2022-07-19

## 2021-08-24 ASSESSMENT — ENCOUNTER SYMPTOMS
EYES NEGATIVE: 1
GASTROINTESTINAL NEGATIVE: 1
NEUROLOGICAL NEGATIVE: 1
CARDIOVASCULAR NEGATIVE: 1
PSYCHIATRIC NEGATIVE: 1
RESPIRATORY NEGATIVE: 1
CONSTITUTIONAL NEGATIVE: 1
BACK PAIN: 1

## 2021-08-24 ASSESSMENT — FIBROSIS 4 INDEX: FIB4 SCORE: 0.71

## 2021-08-24 ASSESSMENT — PAIN SCALES - GENERAL: PAINLEVEL: 10=SEVERE PAIN

## 2021-08-24 NOTE — PROGRESS NOTES
"Subjective:   Mariela Machado is a 61 y.o. female who presents for Low Back Pain (x2weeks, States \"threw back out\")       Back Pain  This is a new problem. The current episode started 1 to 4 weeks ago (2 weeks). The problem occurs constantly. The problem is unchanged. The pain is present in the lumbar spine and gluteal. The pain radiates to the left thigh. The pain is severe. The pain is the same all the time. The symptoms are aggravated by bending, lying down, position, sitting and standing. Stiffness is present all day. She has tried bed rest, heat, ice, muscle relaxant, NSAIDs and walking for the symptoms. The treatment provided mild relief.         Review of Systems   Constitutional: Negative.    HENT: Negative.    Eyes: Negative.    Respiratory: Negative.    Cardiovascular: Negative.    Gastrointestinal: Negative.    Genitourinary: Negative.    Musculoskeletal: Positive for back pain.   Skin: Negative.    Neurological: Negative.    Psychiatric/Behavioral: Negative.    All other systems reviewed and are negative.      MEDS:   Current Outpatient Medications:   •  B Complex Vitamins (B COMPLEX 1 PO), Take  by mouth., Disp: , Rfl:   •  Probiotic Product (PROBIOTIC-10 PO), Take  by mouth., Disp: , Rfl:   •  omeprazole (PRILOSEC) 20 MG delayed-release capsule, Take 1 Cap by mouth every day., Disp: 90 Cap, Rfl: 1  •  estradiol (ESTRACE) 1 MG Tab, TAKE 1 TABLET BY MOUTH ONCE DAILY, Disp: 90 Tab, Rfl: 3  •  Omega-3 Fatty Acids (FISH OIL) 1000 MG Cap capsule, Take 1,000 mg by mouth every day., Disp: , Rfl:   •  Biotin 1000 MCG Tab, Take 1,000 mcg by mouth every day., Disp: , Rfl:   ALLERGIES: No Known Allergies    Patient's PMH, SocHx, SurgHx, FamHx, Drug allergies and medications were reviewed.     Objective:   /94   Pulse (!) 102   Temp 36.2 °C (97.2 °F) (Temporal)   Resp 16   Ht 1.727 m (5' 8\")   Wt 70.8 kg (156 lb)   SpO2 98%   BMI 23.72 kg/m²     Physical Exam  Vitals and nursing note reviewed. "   Constitutional:       General: She is awake.      Appearance: Normal appearance. She is well-developed.   HENT:      Head: Normocephalic and atraumatic.      Right Ear: External ear normal.      Left Ear: External ear normal.      Nose: Nose normal.      Mouth/Throat:      Mouth: Mucous membranes are moist.      Pharynx: Oropharynx is clear.   Eyes:      Extraocular Movements: Extraocular movements intact.      Conjunctiva/sclera: Conjunctivae normal.   Cardiovascular:      Rate and Rhythm: Normal rate and regular rhythm.   Pulmonary:      Effort: Pulmonary effort is normal.      Breath sounds: Normal breath sounds.   Musculoskeletal:      Cervical back: Normal range of motion and neck supple.      Lumbar back: Spasms and tenderness present. Decreased range of motion.        Back:    Skin:     General: Skin is warm and dry.   Neurological:      Mental Status: She is alert and oriented to person, place, and time.      Cranial Nerves: Cranial nerves are intact.      Motor: Motor function is intact.      Gait: Gait abnormal.      Comments: Gait assisted with one point cane, slow and appears to be in pain.  Paraspinal muscle spasm, right greater than left.   Psychiatric:         Mood and Affect: Mood normal.         Behavior: Behavior normal.         Thought Content: Thought content normal.       Narrative & Impression     8/24/2021 9:56 AM     HISTORY/REASON FOR EXAM:  Atraumatic low back pain for 2 weeks.        TECHNIQUE/ EXAM DESCRIPTION AND NUMBER OF VIEWS:  3 views of the lumbar spine.     COMPARISON: None.     FINDINGS:  The alignment of the lumbar spine is within normal limits.     There is no compression fracture.     There is mild degenerative disc space narrowing with endplate sclerosis and spurring at the L3-4, L4-5 and L5-S1 level. There is mild facet arthropathy at the same levels.     SI joints are normal.     IMPRESSION:     1.  There is no acute fracture or malalignment of the lumbar spine.  2.  There  is mild degenerative disc disease and arthropathy from L3-4 through L5-S1 level.        Exam Ended: 08/24/21 10:17 AM Last Resulted: 08/24/21 10:24 AM          Assessment/Plan:   Assessment    1. Acute midline low back pain with left-sided sciatica  - DX-LUMBAR SPINE-2 OR 3 VIEWS; Future  - cyclobenzaprine (FLEXERIL) 5 mg tablet; Take 1-2 Tablets by mouth 3 times a day as needed.  Dispense: 30 Tablet; Refill: 0  - naproxen (NAPROSYN) 500 MG Tab; Take 1 Tablet by mouth 2 times a day with meals.  Dispense: 60 Tablet; Refill: 0  - methylPREDNISolone (MEDROL DOSEPAK) 4 MG Tablet Therapy Pack; Follow schedule on package instructions.  Dispense: 21 Tablet; Refill: 0  - REFERRAL TO OUTPATIENT INTERVENTIONAL PAIN CLINIC    2. DDD (degenerative disc disease), lumbar  - naproxen (NAPROSYN) 500 MG Tab; Take 1 Tablet by mouth 2 times a day with meals.  Dispense: 60 Tablet; Refill: 0  - methylPREDNISolone (MEDROL DOSEPAK) 4 MG Tablet Therapy Pack; Follow schedule on package instructions.  Dispense: 21 Tablet; Refill: 0  - REFERRAL TO OUTPATIENT INTERVENTIONAL PAIN CLINIC    3. Muscle spasm  - cyclobenzaprine (FLEXERIL) 5 mg tablet; Take 1-2 Tablets by mouth 3 times a day as needed.  Dispense: 30 Tablet; Refill: 0  - naproxen (NAPROSYN) 500 MG Tab; Take 1 Tablet by mouth 2 times a day with meals.  Dispense: 60 Tablet; Refill: 0  - methylPREDNISolone (MEDROL DOSEPAK) 4 MG Tablet Therapy Pack; Follow schedule on package instructions.  Dispense: 21 Tablet; Refill: 0  - REFERRAL TO OUTPATIENT INTERVENTIONAL PAIN CLINIC    4. Antalgic gait  - naproxen (NAPROSYN) 500 MG Tab; Take 1 Tablet by mouth 2 times a day with meals.  Dispense: 60 Tablet; Refill: 0  - methylPREDNISolone (MEDROL DOSEPAK) 4 MG Tablet Therapy Pack; Follow schedule on package instructions.  Dispense: 21 Tablet; Refill: 0  - REFERRAL TO OUTPATIENT INTERVENTIONAL PAIN CLINIC    5. Insomnia due to medical condition  - naproxen (NAPROSYN) 500 MG Tab; Take 1 Tablet by  mouth 2 times a day with meals.  Dispense: 60 Tablet; Refill: 0  - methylPREDNISolone (MEDROL DOSEPAK) 4 MG Tablet Therapy Pack; Follow schedule on package instructions.  Dispense: 21 Tablet; Refill: 0  - REFERRAL TO OUTPATIENT INTERVENTIONAL PAIN CLINIC    Vital signs stable at today's acute urgent care visit.  Reviewed test results that were completed in the clinic.  Begin medications as listed. Discussed management options (risks, benefits, and alternatives to treatment).  Will refer to physiatry, as she has had significant relief from an epidural in the past.    Advised the patient to follow-up with the primary care provider for recheck, reevaluation, and/or consideration of further management if necessary. Return to urgent care with any worsening symptoms or if there is no improvement in their current condition. Red flags discussed and indications to immediately call 911 or present to the ED.  All questions were encouraged and answered to the patient's satisfaction and understanding, and they agree to the plan of care.     I personally reviewed prior external notes and test results pertinent to today's visit.  I have independently reviewed and interpreted all diagnostics ordered during this urgent care acute visit. Time spent evaluating this patient was a minimum of 30 minutes and includes preparing for visit, counseling/education, exam, evaluation, obtaining history, and ordering lab/test/procedures.      Please note that this dictation was created using voice recognition software. I have made a reasonable attempt to correct obvious errors, but I expect that there are errors of grammar and possibly content that I did not discover before finalizing the note.

## 2021-12-22 DIAGNOSIS — N95.1 HOT FLASHES DUE TO MENOPAUSE: ICD-10-CM

## 2021-12-22 DIAGNOSIS — R68.82 LOW LIBIDO: ICD-10-CM

## 2021-12-22 RX ORDER — ESTRADIOL 1 MG/1
TABLET ORAL
Qty: 60 TABLET | Refills: 0 | Status: SHIPPED | OUTPATIENT
Start: 2021-12-22 | End: 2022-08-15

## 2021-12-23 NOTE — TELEPHONE ENCOUNTER
Received request via: Pharmacy    Was the patient seen in the last year in this department? No   Last OV 8/2020    Does the patient have an active prescription (recently filled or refills available) for medication(s) requested? No    Requested Prescriptions     Pending Prescriptions Disp Refills    estradiol (ESTRACE) 1 MG Tab [Pharmacy Med Name: Estradiol 1 MG Oral Tablet] 60 Tablet 0     Sig: TAKE 1 TABLET BY MOUTH ONCE DAILY . APPOINTMENT REQUIRED FOR FUTURE REFILLS

## 2022-07-19 ENCOUNTER — OFFICE VISIT (OUTPATIENT)
Dept: URGENT CARE | Facility: PHYSICIAN GROUP | Age: 62
End: 2022-07-19
Payer: COMMERCIAL

## 2022-07-19 VITALS
RESPIRATION RATE: 16 BRPM | HEART RATE: 101 BPM | WEIGHT: 145 LBS | DIASTOLIC BLOOD PRESSURE: 80 MMHG | HEIGHT: 68 IN | BODY MASS INDEX: 21.98 KG/M2 | SYSTOLIC BLOOD PRESSURE: 140 MMHG | OXYGEN SATURATION: 98 % | TEMPERATURE: 98.2 F

## 2022-07-19 DIAGNOSIS — J40 BRONCHITIS: ICD-10-CM

## 2022-07-19 PROCEDURE — 99214 OFFICE O/P EST MOD 30 MIN: CPT

## 2022-07-19 RX ORDER — CETIRIZINE HYDROCHLORIDE 10 MG/1
10 TABLET ORAL DAILY
Qty: 30 TABLET | Refills: 0 | Status: SHIPPED | OUTPATIENT
Start: 2022-07-19

## 2022-07-19 RX ORDER — GUAIFENESIN 600 MG/1
600 TABLET, EXTENDED RELEASE ORAL EVERY 12 HOURS
Qty: 28 TABLET | Refills: 0 | Status: SHIPPED | OUTPATIENT
Start: 2022-07-19 | End: 2022-07-23

## 2022-07-19 RX ORDER — ALBUTEROL SULFATE 90 UG/1
2 AEROSOL, METERED RESPIRATORY (INHALATION) EVERY 6 HOURS PRN
Qty: 8.5 G | Refills: 0 | Status: SHIPPED | OUTPATIENT
Start: 2022-07-19

## 2022-07-19 RX ORDER — PREDNISONE 10 MG/1
40 TABLET ORAL DAILY
Qty: 20 TABLET | Refills: 0 | Status: SHIPPED | OUTPATIENT
Start: 2022-07-19 | End: 2022-07-24

## 2022-07-19 RX ORDER — FLUTICASONE PROPIONATE 50 MCG
1 SPRAY, SUSPENSION (ML) NASAL DAILY
Qty: 16 G | Refills: 0 | Status: SHIPPED | OUTPATIENT
Start: 2022-07-19

## 2022-07-19 RX ORDER — DOXYCYCLINE HYCLATE 100 MG
100 TABLET ORAL 2 TIMES DAILY
Qty: 14 TABLET | Refills: 0 | Status: SHIPPED | OUTPATIENT
Start: 2022-07-19 | End: 2022-07-26

## 2022-07-19 ASSESSMENT — FIBROSIS 4 INDEX: FIB4 SCORE: 0.72

## 2022-07-19 NOTE — PROGRESS NOTES
Subjective:   Mariela Machado is a 62 y.o. female who presents for Coronavirus Screening (Tested positive for covid on the 12th, still having symptoms )      HPI:  Patient presents with her older sister for complaints related to covid.   Main complaints include: diarrhea, wheezing, nasal congestion.   States stool is green. Has been using oral replacement for diarrhea.  Diarrhea is not responding to Imodium use.  She has been pushing her clear fluid intake as well as vitamins and electrolytes as result of the diarrhea. Patient is concerned of labile BP, all documentation although elevated is not consistent with hypertensive emergency.  Average readings still are less than 140/80 at home.  Patient does endorse rapid heart rate at baseline.  Patient does endorse decreased appetite.   Denies history of cancer or immunocompromising conditions.  Patient reports EMS calls and 1 ER visit due to dehydration.  Overall patient states she feels like she is improving since original symptoms began.  Onset of symptoms was approximately 9 days ago.  Patient is still complaining of profuse nasal drainage, dizziness, headaches, fatigue.  Denies fever, chills, night sweats, nausea, vomiting.       ROS as above per HPI    Medications:    Current Outpatient Medications on File Prior to Visit   Medication Sig Dispense Refill   • estradiol (ESTRACE) 1 MG Tab TAKE 1 TABLET BY MOUTH ONCE DAILY . APPOINTMENT REQUIRED FOR FUTURE REFILLS 60 Tablet 0   • B Complex Vitamins (B COMPLEX 1 PO) Take  by mouth.     • Probiotic Product (PROBIOTIC-10 PO) Take  by mouth.     • omeprazole (PRILOSEC) 20 MG delayed-release capsule Take 1 Cap by mouth every day. 90 Cap 1   • Omega-3 Fatty Acids (FISH OIL) 1000 MG Cap capsule Take 1,000 mg by mouth every day.     • Biotin 1000 MCG Tab Take 1,000 mcg by mouth every day.       No current facility-administered medications on file prior to visit.        Allergies:   Patient has no known allergies.    Problem  "List:   Patient Active Problem List   Diagnosis   • Increased frequency of headaches   • Healthcare maintenance   • Low libido   • Hot flashes due to menopause   • Mixed hyperlipidemia   • Anxiety   • Acute left-sided low back pain without sciatica   • Weight gain   • Abdominal bloating   • Hair loss   • Fatigue        Surgical History:  Past Surgical History:   Procedure Laterality Date   • HYSTERECTOMY LAPAROSCOPY         Past Social Hx:   Social History     Tobacco Use   • Smoking status: Never Smoker   • Smokeless tobacco: Never Used   Vaping Use   • Vaping Use: Never used   Substance Use Topics   • Alcohol use: Yes     Alcohol/week: 1.2 oz     Types: 2 Cans of beer per week   • Drug use: No          Problem list, medications, and allergies reviewed by myself today in Epic.     Objective:     BP (!) 140/80   Pulse (!) 101   Temp 36.8 °C (98.2 °F) (Temporal)   Resp 16   Ht 1.727 m (5' 8\")   Wt 65.8 kg (145 lb)   SpO2 98%   BMI 22.05 kg/m²     Physical Exam  Vitals reviewed.   Constitutional:       Appearance: Normal appearance.   HENT:      Head: Normocephalic and atraumatic.      Right Ear: Ear canal normal. A middle ear effusion is present.      Left Ear: Ear canal normal. A middle ear effusion is present.      Nose: Congestion and rhinorrhea present. Rhinorrhea is clear.      Right Sinus: No maxillary sinus tenderness or frontal sinus tenderness.      Left Sinus: No maxillary sinus tenderness or frontal sinus tenderness.      Mouth/Throat:      Mouth: Mucous membranes are moist.      Pharynx: Uvula midline. Posterior oropharyngeal erythema present. No oropharyngeal exudate.      Tonsils: No tonsillar exudate.   Eyes:      Conjunctiva/sclera: Conjunctivae normal.      Pupils: Pupils are equal, round, and reactive to light.   Cardiovascular:      Rate and Rhythm: Normal rate and regular rhythm.      Pulses: Normal pulses.      Heart sounds: Normal heart sounds.   Pulmonary:      Effort: Pulmonary effort " is normal. No respiratory distress.      Breath sounds: Examination of the right-upper field reveals wheezing. Examination of the right-middle field reveals decreased breath sounds and wheezing. Examination of the left-middle field reveals decreased breath sounds and wheezing. Examination of the right-lower field reveals decreased breath sounds. Examination of the left-lower field reveals decreased breath sounds. Decreased breath sounds and wheezing present. No rhonchi or rales.   Chest:      Chest wall: Tenderness present.   Abdominal:      General: Bowel sounds are normal.      Palpations: Abdomen is soft.   Musculoskeletal:         General: Normal range of motion.   Skin:     General: Skin is warm and dry.      Capillary Refill: Capillary refill takes less than 2 seconds.   Neurological:      Mental Status: She is alert and oriented to person, place, and time.         Assessment/Plan:     Diagnosis and associated orders:   1. Bronchitis  - Cdiff By PCR Rflx Toxin; Future  - albuterol 108 (90 Base) MCG/ACT Aero Soln inhalation aerosol; Inhale 2 Puffs every 6 hours as needed for Shortness of Breath.  Dispense: 8.5 g; Refill: 0  - predniSONE (DELTASONE) 10 MG Tab; Take 4 Tablets by mouth every day for 5 days.  Dispense: 20 Tablet; Refill: 0  - guaiFENesin ER (MUCINEX) 600 MG TABLET SR 12 HR; Take 1 Tablet by mouth every 12 hours for 14 days.  Dispense: 28 Tablet; Refill: 0  - cetirizine (ZYRTEC ALLERGY) 10 MG Tab; Take 1 Tablet by mouth every day.  Dispense: 30 Tablet; Refill: 0  - doxycycline (VIBRAMYCIN) 100 MG Tab; Take 1 Tablet by mouth 2 times a day for 7 days.  Dispense: 14 Tablet; Refill: 0  - fluticasone (FLONASE) 50 MCG/ACT nasal spray; Administer 1 Spray into affected nostril(S) every day.  Dispense: 16 g; Refill: 0        Comments/MDM:     Patient is a pleasant 6-year-old female who presents with her older sister for concerns of COVID complications involving diarrhea, labile blood pressure, pneumonia.   Due to prolonged course of diarrhea will obtain stool sample to check patient stool for C. difficile.  Patient is to return stool sample to lab and will will discuss results with patient when available.  Due to prolonged course of illness with reported complaints of dizziness, wheezing and productive cough we will treat patient for bronchitis with albuterol, prednisone, Mucinex, doxycycline.  Will attempt to improve nasal congestion with antihistamine and fluticasone.  Patient reports at baseline she is anxious, encourage patient to review review mindful meditation practices.  Patient also encouraged to watch deep breathing exercises on YouTube to to assist with respiratory.  No imaging obtained today.  Supportive care encouraged. Will discuss results with patient when available. Patient verbalized understanding and consented to plan of care.     Pt is clinically stable at today's acute urgent care visit.  No acute distress noted. Appropriate for outpatient management at this time.       • Discussed DDx, management options (risks,benefits, and alternatives to planned treatment), natural progression and supportive care.  Expressed understanding and the treatment plan was agreed upon. Questions were encouraged and answered   • Return to urgent care prn if new or worsening sx or if there is no improvement in condition prn.    • Educated in Red flags and indications to immediately call 911 or present to the Emergency Department.   • Advised the patient to follow-up with the primary care physician for recheck, reevaluation, and consideration of further management.      Please note that this dictation was created using voice recognition software. I have made a reasonable attempt to correct obvious errors, but I expect that there are errors of grammar and possibly content that I did not discover before finalizing the note.    My total time spent caring for the patient on the day of the encounter was 50 minutes. This does  not include time spent on separately billable procedures/tests.    This note was electronically signed by Gisele Ramirez DNP

## 2022-07-22 ENCOUNTER — HOSPITAL ENCOUNTER (OUTPATIENT)
Facility: MEDICAL CENTER | Age: 62
End: 2022-07-22
Payer: COMMERCIAL

## 2022-07-22 DIAGNOSIS — J40 BRONCHITIS: ICD-10-CM

## 2022-07-22 LAB
C DIFF DNA SPEC QL NAA+PROBE: NEGATIVE
C DIFF TOX GENS STL QL NAA+PROBE: NEGATIVE

## 2022-07-22 PROCEDURE — 87493 C DIFF AMPLIFIED PROBE: CPT

## 2022-07-23 ENCOUNTER — TELEPHONE (OUTPATIENT)
Dept: URGENT CARE | Facility: CLINIC | Age: 62
End: 2022-07-23

## 2022-07-23 ENCOUNTER — APPOINTMENT (OUTPATIENT)
Dept: RADIOLOGY | Facility: IMAGING CENTER | Age: 62
End: 2022-07-23
Attending: FAMILY MEDICINE
Payer: COMMERCIAL

## 2022-07-23 ENCOUNTER — OFFICE VISIT (OUTPATIENT)
Dept: URGENT CARE | Facility: PHYSICIAN GROUP | Age: 62
End: 2022-07-23
Payer: COMMERCIAL

## 2022-07-23 VITALS
HEIGHT: 68 IN | TEMPERATURE: 97.4 F | RESPIRATION RATE: 16 BRPM | DIASTOLIC BLOOD PRESSURE: 88 MMHG | OXYGEN SATURATION: 95 % | WEIGHT: 145 LBS | SYSTOLIC BLOOD PRESSURE: 156 MMHG | BODY MASS INDEX: 21.98 KG/M2 | HEART RATE: 115 BPM

## 2022-07-23 DIAGNOSIS — R09.89 CHEST CONGESTION: ICD-10-CM

## 2022-07-23 DIAGNOSIS — R09.81 HEAD CONGESTION: ICD-10-CM

## 2022-07-23 DIAGNOSIS — R10.13 EPIGASTRIC PRESSURE: ICD-10-CM

## 2022-07-23 DIAGNOSIS — R06.2 WHEEZING: ICD-10-CM

## 2022-07-23 PROCEDURE — 99214 OFFICE O/P EST MOD 30 MIN: CPT | Performed by: FAMILY MEDICINE

## 2022-07-23 PROCEDURE — 71046 X-RAY EXAM CHEST 2 VIEWS: CPT | Mod: TC,FY | Performed by: FAMILY MEDICINE

## 2022-07-23 RX ORDER — PREDNISONE 20 MG/1
TABLET ORAL
Qty: 20 TABLET | Refills: 0 | Status: SHIPPED | OUTPATIENT
Start: 2022-07-23 | End: 2022-08-16

## 2022-07-23 ASSESSMENT — FIBROSIS 4 INDEX: FIB4 SCORE: 0.72

## 2022-07-23 NOTE — PROGRESS NOTES
"Chief Complaint:    Chief Complaint   Patient presents with   • Headache     COVID +, pt states feeling pressure in head and chest due to congestion.        History of Present Illness:    She had positive Covid test on 7/12/22, saw other provider on 7/19/22, visit reviewed. On 7/19/22, she was prescribed Doxycycline 100 mg BID x 7 days, Prednisone 10 mg - 4 a day x 5 days, Albuterol inhaler, Flonase nose spray, Cetirizine 10 mg, and Guaifenesin 600 mg. She reports she did not get Guaifenesin - wasn't part of the meds to , but she started everything else. She presents today with worsening \"thickening\" in head and chest.      Past Medical History:    Past Medical History:   Diagnosis Date   • Menopause    • Vertigo      Past Surgical History:    Past Surgical History:   Procedure Laterality Date   • HYSTERECTOMY LAPAROSCOPY       Social History:    Social History     Socioeconomic History   • Marital status:      Spouse name: Not on file   • Number of children: Not on file   • Years of education: Not on file   • Highest education level: Not on file   Occupational History   • Not on file   Tobacco Use   • Smoking status: Never Smoker   • Smokeless tobacco: Never Used   Vaping Use   • Vaping Use: Never used   Substance and Sexual Activity   • Alcohol use: Yes     Alcohol/week: 1.2 oz     Types: 2 Cans of beer per week   • Drug use: No   • Sexual activity: Yes     Partners: Male   Other Topics Concern   • Not on file   Social History Narrative   • Not on file     Social Determinants of Health     Financial Resource Strain: Not on file   Food Insecurity: Not on file   Transportation Needs: Not on file   Physical Activity: Not on file   Stress: Not on file   Social Connections: Not on file   Intimate Partner Violence: Not on file   Housing Stability: Not on file     Family History:    History reviewed. No pertinent family history.    Medications:    Current Outpatient Medications on File Prior to Visit " "  Medication Sig Dispense Refill   • albuterol 108 (90 Base) MCG/ACT Aero Soln inhalation aerosol Inhale 2 Puffs every 6 hours as needed for Shortness of Breath. 8.5 g 0   • predniSONE (DELTASONE) 10 MG Tab Take 4 Tablets by mouth every day for 5 days. 20 Tablet 0   • cetirizine (ZYRTEC ALLERGY) 10 MG Tab Take 1 Tablet by mouth every day. 30 Tablet 0   • doxycycline (VIBRAMYCIN) 100 MG Tab Take 1 Tablet by mouth 2 times a day for 7 days. 14 Tablet 0   • fluticasone (FLONASE) 50 MCG/ACT nasal spray Administer 1 Spray into affected nostril(S) every day. 16 g 0   • estradiol (ESTRACE) 1 MG Tab TAKE 1 TABLET BY MOUTH ONCE DAILY . APPOINTMENT REQUIRED FOR FUTURE REFILLS 60 Tablet 0   • B Complex Vitamins (B COMPLEX 1 PO) Take  by mouth.     • Probiotic Product (PROBIOTIC-10 PO) Take  by mouth.     • omeprazole (PRILOSEC) 20 MG delayed-release capsule Take 1 Cap by mouth every day. 90 Cap 1   • Omega-3 Fatty Acids (FISH OIL) 1000 MG Cap capsule Take 1,000 mg by mouth every day.     • Biotin 1000 MCG Tab Take 1,000 mcg by mouth every day.       No current facility-administered medications on file prior to visit.     Allergies:    No Known Allergies      Vitals:    Vitals:    07/23/22 1327   BP: (!) 156/88   Pulse: (!) 115   Resp: 16   Temp: 36.3 °C (97.4 °F)   TempSrc: Temporal   SpO2: 95%   Weight: 65.8 kg (145 lb)   Height: 1.727 m (5' 8\")       Physical Exam:    Constitutional: Vital signs reviewed. Appears well-developed and well-nourished. No acute distress.   Eyes: Sclera white, conjunctivae clear. PERRLA.  ENT: External ears normal. External auditory canals normal without discharge. TMs translucent and non-bulging. Hearing normal. Lips/teeth are normal. Oral mucosa pink and moist. Posterior pharynx: WNL.  Neck: Neck supple.   Cardiovascular: Tachycardic. Regular rhythm. No murmur.  Pulmonary/Chest: Respirations non-labored. Mild basilar wheezing bilaterally.  Musculoskeletal: Normal gait. No muscular atrophy or " weakness.  Neurological: Alert and oriented to person, place, and time. CN 2-12 intact. Muscle tone normal. Coordination normal.   Skin: No rashes or lesions. Warm, dry, normal turgor.  Psychiatric: Normal mood and affect. Behavior is normal. Judgment and thought content normal.       Diagnostics:    DX-CHEST-2 VIEWS  Order: [id number]   Status: Final result     Visible to patient: No (scheduled for 2022 12:30 PM)     Next appt: None     Dx: Chest congestion     0 Result Notes    Details    Reading Physician Reading Date Result Priority   Carlos Maurice M.D.  [phone] 2022 Ur[facility]     Narrative & Impression     2022 2:15 PM     HISTORY/REASON FOR EXAM:  Cough;        TECHNIQUE/EXAM DESCRIPTION AND NUMBER OF VIEWS:  Two views of the chest.     COMPARISON:  None.     FINDINGS:     No pulmonary infiltrates or consolidations are noted.  No pleural effusions, no pneumothorax are appreciated.  Normal cardiopericardial silhouette.     IMPRESSION:        1. No active cardiopulmonary abnormalities are identified.     I personally reviewed the images. Rad report reviewed with her and copy of report to her.      Medical Decision Makin. Head congestion  - predniSONE (DELTASONE) 20 MG Tab; 1 TAB BY MOUTH UP TO TWICE A DAY ONLY IF NEEDED FOR HEAD OR CHEST CONGESTION. TAKE WITH FOOD.  Dispense: 20 Tablet; Refill: 0    2. Chest congestion  - DX-CHEST-2 VIEWS; Future  - predniSONE (DELTASONE) 20 MG Tab; 1 TAB BY MOUTH UP TO TWICE A DAY ONLY IF NEEDED FOR HEAD OR CHEST CONGESTION. TAKE WITH FOOD.  Dispense: 20 Tablet; Refill: 0    3. Wheezing  - DX-CHEST-2 VIEWS; Future  - predniSONE (DELTASONE) 20 MG Tab; 1 TAB BY MOUTH UP TO TWICE A DAY ONLY IF NEEDED FOR HEAD OR CHEST CONGESTION. TAKE WITH FOOD.  Dispense: 20 Tablet; Refill: 0      Discussed with her DDX, management options, and risks, benefits, and alternatives to treatment plan agreed upon.    She had positive Covid test on 22, saw other  "provider on 7/19/22, visit reviewed. On 7/19/22, she was prescribed Doxycycline 100 mg BID x 7 days, Prednisone 10 mg - 4 a day x 5 days, Albuterol inhaler, Flonase nose spray, Cetirizine 10 mg, and Guaifenesin 600 mg. She reports she did not get Guaifenesin - wasn't part of the meds to , but she started everything else. She presents today with worsening \"thickening\" in head and chest.    Mild basilar wheezing bilaterally.    CXR: No active cardiopulmonary abnormalities are identified.    Complicated condition due to worsening symptoms despite treatment with other provider on 7/19/22.    I suspect many of her symptoms are post-Covid symptoms that will need to be treated symptomatically.    She may continue with all meds rx'd on 7/19/22. After done with Prednisone rx'd 7/19/22, she may use Prednisone as needed which I prescribed.    May try OTC Mucinex for symptoms as needed which is the Guaifenesin 600 mg that she did not get on 7/19/22 despite Rx done.    Discussed expected course of duration, time for improvement, and to seek follow-up in Emergency Room, urgent care, or with PCP if getting worse at any time or not improving within expected time frame.  "

## 2022-07-24 ENCOUNTER — APPOINTMENT (OUTPATIENT)
Dept: RADIOLOGY | Facility: IMAGING CENTER | Age: 62
End: 2022-07-24
Payer: COMMERCIAL

## 2022-07-25 ENCOUNTER — TELEPHONE (OUTPATIENT)
Dept: SCHEDULING | Facility: IMAGING CENTER | Age: 62
End: 2022-07-25

## 2022-08-15 ENCOUNTER — APPOINTMENT (OUTPATIENT)
Dept: RADIOLOGY | Facility: MEDICAL CENTER | Age: 62
End: 2022-08-15
Attending: EMERGENCY MEDICINE
Payer: COMMERCIAL

## 2022-08-15 ENCOUNTER — OFFICE VISIT (OUTPATIENT)
Dept: URGENT CARE | Facility: PHYSICIAN GROUP | Age: 62
End: 2022-08-15
Payer: COMMERCIAL

## 2022-08-15 ENCOUNTER — HOSPITAL ENCOUNTER (OUTPATIENT)
Facility: MEDICAL CENTER | Age: 62
End: 2022-08-18
Attending: EMERGENCY MEDICINE | Admitting: HOSPITALIST
Payer: COMMERCIAL

## 2022-08-15 VITALS
TEMPERATURE: 97.5 F | HEART RATE: 118 BPM | DIASTOLIC BLOOD PRESSURE: 88 MMHG | SYSTOLIC BLOOD PRESSURE: 148 MMHG | BODY MASS INDEX: 22.04 KG/M2 | OXYGEN SATURATION: 97 % | HEIGHT: 68 IN | RESPIRATION RATE: 18 BRPM | WEIGHT: 145.4 LBS

## 2022-08-15 DIAGNOSIS — R00.0 RACING HEART BEAT: ICD-10-CM

## 2022-08-15 DIAGNOSIS — R07.89 OTHER CHEST PAIN: ICD-10-CM

## 2022-08-15 DIAGNOSIS — R07.9 CHEST PAIN, UNSPECIFIED TYPE: ICD-10-CM

## 2022-08-15 LAB
ALBUMIN SERPL BCP-MCNC: 5 G/DL (ref 3.2–4.9)
ALBUMIN/GLOB SERPL: 1.9 G/DL
ALP SERPL-CCNC: 73 U/L (ref 30–99)
ALT SERPL-CCNC: 20 U/L (ref 2–50)
ANION GAP SERPL CALC-SCNC: 17 MMOL/L (ref 7–16)
AST SERPL-CCNC: 21 U/L (ref 12–45)
BASOPHILS # BLD AUTO: 0.2 % (ref 0–1.8)
BASOPHILS # BLD: 0.02 K/UL (ref 0–0.12)
BILIRUB SERPL-MCNC: 0.8 MG/DL (ref 0.1–1.5)
BUN SERPL-MCNC: 6 MG/DL (ref 8–22)
CALCIUM SERPL-MCNC: 9.9 MG/DL (ref 8.5–10.5)
CHLORIDE SERPL-SCNC: 103 MMOL/L (ref 96–112)
CO2 SERPL-SCNC: 20 MMOL/L (ref 20–33)
CREAT SERPL-MCNC: 0.64 MG/DL (ref 0.5–1.4)
EKG IMPRESSION: NORMAL
EOSINOPHIL # BLD AUTO: 0 K/UL (ref 0–0.51)
EOSINOPHIL NFR BLD: 0 % (ref 0–6.9)
ERYTHROCYTE [DISTWIDTH] IN BLOOD BY AUTOMATED COUNT: 41 FL (ref 35.9–50)
GFR SERPLBLD CREATININE-BSD FMLA CKD-EPI: 100 ML/MIN/1.73 M 2
GLOBULIN SER CALC-MCNC: 2.7 G/DL (ref 1.9–3.5)
GLUCOSE SERPL-MCNC: 133 MG/DL (ref 65–99)
HCT VFR BLD AUTO: 43.8 % (ref 37–47)
HGB BLD-MCNC: 15 G/DL (ref 12–16)
IMM GRANULOCYTES # BLD AUTO: 0.04 K/UL (ref 0–0.11)
IMM GRANULOCYTES NFR BLD AUTO: 0.4 % (ref 0–0.9)
LACTATE SERPL-SCNC: 1.7 MMOL/L (ref 0.5–2)
LYMPHOCYTES # BLD AUTO: 1.32 K/UL (ref 1–4.8)
LYMPHOCYTES NFR BLD: 12.9 % (ref 22–41)
MCH RBC QN AUTO: 30.4 PG (ref 27–33)
MCHC RBC AUTO-ENTMCNC: 34.2 G/DL (ref 33.6–35)
MCV RBC AUTO: 88.7 FL (ref 81.4–97.8)
MONOCYTES # BLD AUTO: 0.11 K/UL (ref 0–0.85)
MONOCYTES NFR BLD AUTO: 1.1 % (ref 0–13.4)
NEUTROPHILS # BLD AUTO: 8.73 K/UL (ref 2–7.15)
NEUTROPHILS NFR BLD: 85.4 % (ref 44–72)
NRBC # BLD AUTO: 0 K/UL
NRBC BLD-RTO: 0 /100 WBC
PLATELET # BLD AUTO: 335 K/UL (ref 164–446)
PMV BLD AUTO: 9.7 FL (ref 9–12.9)
POTASSIUM SERPL-SCNC: 3.5 MMOL/L (ref 3.6–5.5)
PROT SERPL-MCNC: 7.7 G/DL (ref 6–8.2)
RBC # BLD AUTO: 4.94 M/UL (ref 4.2–5.4)
SODIUM SERPL-SCNC: 140 MMOL/L (ref 135–145)
TROPONIN T SERPL-MCNC: <6 NG/L (ref 6–19)
WBC # BLD AUTO: 10.2 K/UL (ref 4.8–10.8)

## 2022-08-15 PROCEDURE — 85025 COMPLETE CBC W/AUTO DIFF WBC: CPT

## 2022-08-15 PROCEDURE — 80307 DRUG TEST PRSMV CHEM ANLYZR: CPT

## 2022-08-15 PROCEDURE — 93005 ELECTROCARDIOGRAM TRACING: CPT

## 2022-08-15 PROCEDURE — 93000 ELECTROCARDIOGRAM COMPLETE: CPT | Performed by: STUDENT IN AN ORGANIZED HEALTH CARE EDUCATION/TRAINING PROGRAM

## 2022-08-15 PROCEDURE — 99285 EMERGENCY DEPT VISIT HI MDM: CPT

## 2022-08-15 PROCEDURE — 80053 COMPREHEN METABOLIC PANEL: CPT

## 2022-08-15 PROCEDURE — 74177 CT ABD & PELVIS W/CONTRAST: CPT

## 2022-08-15 PROCEDURE — 700117 HCHG RX CONTRAST REV CODE 255: Performed by: EMERGENCY MEDICINE

## 2022-08-15 PROCEDURE — 83605 ASSAY OF LACTIC ACID: CPT

## 2022-08-15 PROCEDURE — 71045 X-RAY EXAM CHEST 1 VIEW: CPT

## 2022-08-15 PROCEDURE — 36415 COLL VENOUS BLD VENIPUNCTURE: CPT

## 2022-08-15 PROCEDURE — 93005 ELECTROCARDIOGRAM TRACING: CPT | Performed by: EMERGENCY MEDICINE

## 2022-08-15 PROCEDURE — 99215 OFFICE O/P EST HI 40 MIN: CPT | Performed by: STUDENT IN AN ORGANIZED HEALTH CARE EDUCATION/TRAINING PROGRAM

## 2022-08-15 PROCEDURE — 84484 ASSAY OF TROPONIN QUANT: CPT

## 2022-08-15 PROCEDURE — 71275 CT ANGIOGRAPHY CHEST: CPT

## 2022-08-15 RX ADMIN — IOHEXOL 100 ML: 350 INJECTION, SOLUTION INTRAVENOUS at 22:20

## 2022-08-15 ASSESSMENT — PAIN SCALES - GENERAL: PAINLEVEL: 6=MODERATE PAIN

## 2022-08-15 ASSESSMENT — PAIN DESCRIPTION - DESCRIPTORS
DESCRIPTORS: PRESSURE
DESCRIPTORS: PRESSURE

## 2022-08-16 ENCOUNTER — APPOINTMENT (OUTPATIENT)
Dept: CARDIOLOGY | Facility: MEDICAL CENTER | Age: 62
End: 2022-08-16
Attending: HOSPITALIST
Payer: COMMERCIAL

## 2022-08-16 ENCOUNTER — APPOINTMENT (OUTPATIENT)
Dept: RADIOLOGY | Facility: MEDICAL CENTER | Age: 62
End: 2022-08-16
Attending: HOSPITALIST
Payer: COMMERCIAL

## 2022-08-16 PROBLEM — R07.9 CHEST PAIN: Status: ACTIVE | Noted: 2022-08-16

## 2022-08-16 PROBLEM — R50.9 FEVER: Status: ACTIVE | Noted: 2022-08-16

## 2022-08-16 PROBLEM — M79.89 SWELLING OF LOWER EXTREMITY: Status: ACTIVE | Noted: 2022-08-16

## 2022-08-16 PROBLEM — R00.0 TACHYCARDIA: Status: ACTIVE | Noted: 2022-08-16

## 2022-08-16 PROBLEM — K76.9 LIVER LESION: Status: ACTIVE | Noted: 2022-08-16

## 2022-08-16 LAB
ALBUMIN SERPL BCP-MCNC: 4.4 G/DL (ref 3.2–4.9)
ALBUMIN/GLOB SERPL: 1.8 G/DL
ALP SERPL-CCNC: 63 U/L (ref 30–99)
ALT SERPL-CCNC: 17 U/L (ref 2–50)
AMPHET UR QL SCN: NEGATIVE
ANION GAP SERPL CALC-SCNC: 17 MMOL/L (ref 7–16)
AST SERPL-CCNC: 16 U/L (ref 12–45)
BARBITURATES UR QL SCN: NEGATIVE
BASOPHILS # BLD AUTO: 0.2 % (ref 0–1.8)
BASOPHILS # BLD: 0.02 K/UL (ref 0–0.12)
BENZODIAZ UR QL SCN: NEGATIVE
BILIRUB SERPL-MCNC: 0.9 MG/DL (ref 0.1–1.5)
BUN SERPL-MCNC: 6 MG/DL (ref 8–22)
BZE UR QL SCN: NEGATIVE
CALCIUM SERPL-MCNC: 9.3 MG/DL (ref 8.5–10.5)
CANNABINOIDS UR QL SCN: NEGATIVE
CHLORIDE SERPL-SCNC: 106 MMOL/L (ref 96–112)
CO2 SERPL-SCNC: 20 MMOL/L (ref 20–33)
CREAT SERPL-MCNC: 0.66 MG/DL (ref 0.5–1.4)
CRP SERPL HS-MCNC: <0.3 MG/DL (ref 0–0.75)
EOSINOPHIL # BLD AUTO: 0.03 K/UL (ref 0–0.51)
EOSINOPHIL NFR BLD: 0.3 % (ref 0–6.9)
ERYTHROCYTE [DISTWIDTH] IN BLOOD BY AUTOMATED COUNT: 42.2 FL (ref 35.9–50)
FLUAV RNA SPEC QL NAA+PROBE: NEGATIVE
FLUBV RNA SPEC QL NAA+PROBE: NEGATIVE
GFR SERPLBLD CREATININE-BSD FMLA CKD-EPI: 99 ML/MIN/1.73 M 2
GLOBULIN SER CALC-MCNC: 2.4 G/DL (ref 1.9–3.5)
GLUCOSE SERPL-MCNC: 109 MG/DL (ref 65–99)
HCT VFR BLD AUTO: 42 % (ref 37–47)
HGB BLD-MCNC: 14.2 G/DL (ref 12–16)
IMM GRANULOCYTES # BLD AUTO: 0.03 K/UL (ref 0–0.11)
IMM GRANULOCYTES NFR BLD AUTO: 0.3 % (ref 0–0.9)
LV EJECT FRACT  99904: 60
LV EJECT FRACT MOD 2C 99903: 55.42
LV EJECT FRACT MOD 4C 99902: 55.62
LV EJECT FRACT MOD BP 99901: 57.41
LYMPHOCYTES # BLD AUTO: 2.94 K/UL (ref 1–4.8)
LYMPHOCYTES NFR BLD: 27.8 % (ref 22–41)
MCH RBC QN AUTO: 30.1 PG (ref 27–33)
MCHC RBC AUTO-ENTMCNC: 33.8 G/DL (ref 33.6–35)
MCV RBC AUTO: 89.2 FL (ref 81.4–97.8)
METHADONE UR QL SCN: NEGATIVE
MONOCYTES # BLD AUTO: 1.08 K/UL (ref 0–0.85)
MONOCYTES NFR BLD AUTO: 10.2 % (ref 0–13.4)
NEUTROPHILS # BLD AUTO: 6.46 K/UL (ref 2–7.15)
NEUTROPHILS NFR BLD: 61.2 % (ref 44–72)
NRBC # BLD AUTO: 0 K/UL
NRBC BLD-RTO: 0 /100 WBC
NT-PROBNP SERPL IA-MCNC: 513 PG/ML (ref 0–125)
OPIATES UR QL SCN: NEGATIVE
OXYCODONE UR QL SCN: NEGATIVE
PCP UR QL SCN: NEGATIVE
PHOSPHATE SERPL-MCNC: 4.6 MG/DL (ref 2.5–4.5)
PLATELET # BLD AUTO: 340 K/UL (ref 164–446)
PMV BLD AUTO: 10 FL (ref 9–12.9)
POTASSIUM SERPL-SCNC: 3.9 MMOL/L (ref 3.6–5.5)
PROCALCITONIN SERPL-MCNC: <0.05 NG/ML
PROPOXYPH UR QL SCN: NEGATIVE
PROT SERPL-MCNC: 6.8 G/DL (ref 6–8.2)
RBC # BLD AUTO: 4.71 M/UL (ref 4.2–5.4)
RSV RNA SPEC QL NAA+PROBE: NEGATIVE
SARS-COV-2 RNA RESP QL NAA+PROBE: DETECTED
SODIUM SERPL-SCNC: 143 MMOL/L (ref 135–145)
SPECIMEN SOURCE: ABNORMAL
TROPONIN T SERPL-MCNC: <6 NG/L (ref 6–19)
TSH SERPL DL<=0.005 MIU/L-ACNC: 1.75 UIU/ML (ref 0.38–5.33)
WBC # BLD AUTO: 10.6 K/UL (ref 4.8–10.8)

## 2022-08-16 PROCEDURE — 96365 THER/PROPH/DIAG IV INF INIT: CPT

## 2022-08-16 PROCEDURE — 86140 C-REACTIVE PROTEIN: CPT

## 2022-08-16 PROCEDURE — 83880 ASSAY OF NATRIURETIC PEPTIDE: CPT

## 2022-08-16 PROCEDURE — G0378 HOSPITAL OBSERVATION PER HR: HCPCS

## 2022-08-16 PROCEDURE — 93005 ELECTROCARDIOGRAM TRACING: CPT | Performed by: HOSPITALIST

## 2022-08-16 PROCEDURE — C9803 HOPD COVID-19 SPEC COLLECT: HCPCS | Performed by: HOSPITALIST

## 2022-08-16 PROCEDURE — 84484 ASSAY OF TROPONIN QUANT: CPT

## 2022-08-16 PROCEDURE — 80053 COMPREHEN METABOLIC PANEL: CPT

## 2022-08-16 PROCEDURE — 93005 ELECTROCARDIOGRAM TRACING: CPT

## 2022-08-16 PROCEDURE — 96372 THER/PROPH/DIAG INJ SC/IM: CPT

## 2022-08-16 PROCEDURE — 99224 PR SUBSEQUENT OBSERVATION CARE,LEVEL I: CPT | Performed by: INTERNAL MEDICINE

## 2022-08-16 PROCEDURE — 96366 THER/PROPH/DIAG IV INF ADDON: CPT

## 2022-08-16 PROCEDURE — 99220 PR INITIAL OBSERVATION CARE,LEVL III: CPT | Performed by: HOSPITALIST

## 2022-08-16 PROCEDURE — 82105 ALPHA-FETOPROTEIN SERUM: CPT

## 2022-08-16 PROCEDURE — 93970 EXTREMITY STUDY: CPT

## 2022-08-16 PROCEDURE — 84443 ASSAY THYROID STIM HORMONE: CPT

## 2022-08-16 PROCEDURE — 700102 HCHG RX REV CODE 250 W/ 637 OVERRIDE(OP): Performed by: HOSPITALIST

## 2022-08-16 PROCEDURE — 36415 COLL VENOUS BLD VENIPUNCTURE: CPT

## 2022-08-16 PROCEDURE — 700111 HCHG RX REV CODE 636 W/ 250 OVERRIDE (IP): Performed by: HOSPITALIST

## 2022-08-16 PROCEDURE — 84145 PROCALCITONIN (PCT): CPT

## 2022-08-16 PROCEDURE — 93306 TTE W/DOPPLER COMPLETE: CPT

## 2022-08-16 PROCEDURE — 93306 TTE W/DOPPLER COMPLETE: CPT | Mod: 26 | Performed by: INTERNAL MEDICINE

## 2022-08-16 PROCEDURE — A9270 NON-COVERED ITEM OR SERVICE: HCPCS | Performed by: HOSPITALIST

## 2022-08-16 PROCEDURE — 85025 COMPLETE CBC W/AUTO DIFF WBC: CPT

## 2022-08-16 PROCEDURE — 74160 CT ABDOMEN W/CONTRAST: CPT

## 2022-08-16 PROCEDURE — 0241U HCHG SARS-COV-2 COVID-19 NFCT DS RESP RNA 4 TRGT MIC: CPT

## 2022-08-16 PROCEDURE — 700117 HCHG RX CONTRAST REV CODE 255: Performed by: HOSPITALIST

## 2022-08-16 PROCEDURE — 84100 ASSAY OF PHOSPHORUS: CPT

## 2022-08-16 RX ORDER — PROMETHAZINE HYDROCHLORIDE 25 MG/1
12.5-25 SUPPOSITORY RECTAL EVERY 4 HOURS PRN
Status: DISCONTINUED | OUTPATIENT
Start: 2022-08-16 | End: 2022-08-18 | Stop reason: HOSPADM

## 2022-08-16 RX ORDER — AMOXICILLIN 250 MG
2 CAPSULE ORAL 2 TIMES DAILY
Status: DISCONTINUED | OUTPATIENT
Start: 2022-08-16 | End: 2022-08-18 | Stop reason: HOSPADM

## 2022-08-16 RX ORDER — REGADENOSON 0.08 MG/ML
0.4 INJECTION, SOLUTION INTRAVENOUS
Status: COMPLETED | OUTPATIENT
Start: 2022-08-16 | End: 2022-08-17

## 2022-08-16 RX ORDER — NITROGLYCERIN 0.4 MG/1
0.4 TABLET SUBLINGUAL
Status: DISCONTINUED | OUTPATIENT
Start: 2022-08-16 | End: 2022-08-18 | Stop reason: HOSPADM

## 2022-08-16 RX ORDER — AMINOPHYLLINE 25 MG/ML
100 INJECTION, SOLUTION INTRAVENOUS
Status: DISCONTINUED | OUTPATIENT
Start: 2022-08-16 | End: 2022-08-18 | Stop reason: HOSPADM

## 2022-08-16 RX ORDER — BISACODYL 10 MG
10 SUPPOSITORY, RECTAL RECTAL
Status: DISCONTINUED | OUTPATIENT
Start: 2022-08-16 | End: 2022-08-18 | Stop reason: HOSPADM

## 2022-08-16 RX ORDER — ASPIRIN 81 MG/1
324 TABLET, CHEWABLE ORAL DAILY
Status: DISCONTINUED | OUTPATIENT
Start: 2022-08-16 | End: 2022-08-18 | Stop reason: HOSPADM

## 2022-08-16 RX ORDER — M-VIT,TX,IRON,MINS/CALC/FOLIC 27MG-0.4MG
1 TABLET ORAL DAILY
COMMUNITY

## 2022-08-16 RX ORDER — LABETALOL HYDROCHLORIDE 5 MG/ML
10 INJECTION, SOLUTION INTRAVENOUS EVERY 4 HOURS PRN
Status: DISCONTINUED | OUTPATIENT
Start: 2022-08-16 | End: 2022-08-18 | Stop reason: HOSPADM

## 2022-08-16 RX ORDER — DOXYCYCLINE HYCLATE 100 MG
100 TABLET ORAL 2 TIMES DAILY
Status: SHIPPED | COMMUNITY
Start: 2022-07-19 | End: 2022-08-16

## 2022-08-16 RX ORDER — MAGNESIUM SULFATE HEPTAHYDRATE 40 MG/ML
2 INJECTION, SOLUTION INTRAVENOUS ONCE
Status: COMPLETED | OUTPATIENT
Start: 2022-08-16 | End: 2022-08-16

## 2022-08-16 RX ORDER — POTASSIUM CHLORIDE 20 MEQ/1
40 TABLET, EXTENDED RELEASE ORAL ONCE
Status: COMPLETED | OUTPATIENT
Start: 2022-08-16 | End: 2022-08-16

## 2022-08-16 RX ORDER — POLYETHYLENE GLYCOL 3350 17 G/17G
1 POWDER, FOR SOLUTION ORAL
Status: DISCONTINUED | OUTPATIENT
Start: 2022-08-16 | End: 2022-08-18 | Stop reason: HOSPADM

## 2022-08-16 RX ORDER — PROMETHAZINE HYDROCHLORIDE 25 MG/1
12.5-25 TABLET ORAL EVERY 4 HOURS PRN
Status: DISCONTINUED | OUTPATIENT
Start: 2022-08-16 | End: 2022-08-18 | Stop reason: HOSPADM

## 2022-08-16 RX ORDER — LORAZEPAM 1 MG/1
0.5 TABLET ORAL EVERY 4 HOURS PRN
Status: DISCONTINUED | OUTPATIENT
Start: 2022-08-16 | End: 2022-08-18 | Stop reason: HOSPADM

## 2022-08-16 RX ORDER — ASPIRIN 325 MG
325 TABLET ORAL DAILY
Status: DISCONTINUED | OUTPATIENT
Start: 2022-08-16 | End: 2022-08-18 | Stop reason: HOSPADM

## 2022-08-16 RX ORDER — ONDANSETRON 2 MG/ML
4 INJECTION INTRAMUSCULAR; INTRAVENOUS EVERY 4 HOURS PRN
Status: DISCONTINUED | OUTPATIENT
Start: 2022-08-16 | End: 2022-08-18 | Stop reason: HOSPADM

## 2022-08-16 RX ORDER — ASPIRIN 300 MG/1
300 SUPPOSITORY RECTAL DAILY
Status: DISCONTINUED | OUTPATIENT
Start: 2022-08-16 | End: 2022-08-18 | Stop reason: HOSPADM

## 2022-08-16 RX ORDER — ALBUTEROL SULFATE 90 UG/1
2 AEROSOL, METERED RESPIRATORY (INHALATION)
Status: DISCONTINUED | OUTPATIENT
Start: 2022-08-16 | End: 2022-08-18 | Stop reason: HOSPADM

## 2022-08-16 RX ORDER — ENOXAPARIN SODIUM 100 MG/ML
40 INJECTION SUBCUTANEOUS DAILY
Status: DISCONTINUED | OUTPATIENT
Start: 2022-08-16 | End: 2022-08-18 | Stop reason: HOSPADM

## 2022-08-16 RX ORDER — PROCHLORPERAZINE EDISYLATE 5 MG/ML
5-10 INJECTION INTRAMUSCULAR; INTRAVENOUS EVERY 4 HOURS PRN
Status: DISCONTINUED | OUTPATIENT
Start: 2022-08-16 | End: 2022-08-18 | Stop reason: HOSPADM

## 2022-08-16 RX ORDER — ONDANSETRON 4 MG/1
4 TABLET, ORALLY DISINTEGRATING ORAL EVERY 4 HOURS PRN
Status: DISCONTINUED | OUTPATIENT
Start: 2022-08-16 | End: 2022-08-18 | Stop reason: HOSPADM

## 2022-08-16 RX ADMIN — ENOXAPARIN SODIUM 40 MG: 40 INJECTION SUBCUTANEOUS at 00:30

## 2022-08-16 RX ADMIN — LORAZEPAM 0.5 MG: 1 TABLET ORAL at 02:01

## 2022-08-16 RX ADMIN — POTASSIUM CHLORIDE 40 MEQ: 20 TABLET, EXTENDED RELEASE ORAL at 00:30

## 2022-08-16 RX ADMIN — ASPIRIN 325 MG: 325 TABLET ORAL at 10:40

## 2022-08-16 RX ADMIN — ENOXAPARIN SODIUM 40 MG: 40 INJECTION SUBCUTANEOUS at 21:36

## 2022-08-16 RX ADMIN — IOHEXOL 100 ML: 350 INJECTION, SOLUTION INTRAVENOUS at 16:03

## 2022-08-16 RX ADMIN — MAGNESIUM SULFATE HEPTAHYDRATE 2 G: 40 INJECTION, SOLUTION INTRAVENOUS at 00:45

## 2022-08-16 ASSESSMENT — ENCOUNTER SYMPTOMS
PHOTOPHOBIA: 0
MYALGIAS: 0
WHEEZING: 0
SINUS PAIN: 0
HALLUCINATIONS: 0
BLOOD IN STOOL: 0
DIARRHEA: 0
ORTHOPNEA: 0
STRIDOR: 0
SHORTNESS OF BREATH: 1
BACK PAIN: 0
SPUTUM PRODUCTION: 0
PALPITATIONS: 0
DIAPHORESIS: 0
DEPRESSION: 0
DOUBLE VISION: 0
BLURRED VISION: 0
BRUISES/BLEEDS EASILY: 0
ABDOMINAL PAIN: 0
FEVER: 1
VOMITING: 0
NAUSEA: 0
WEAKNESS: 0
PND: 0
FLANK PAIN: 0
COUGH: 1
TREMORS: 0
DIZZINESS: 0
CONSTIPATION: 0
FALLS: 0
SORE THROAT: 0
CLAUDICATION: 0
NECK PAIN: 0
POLYDIPSIA: 0
HEARTBURN: 0
CHILLS: 0
HEMOPTYSIS: 0
TINGLING: 0
EYE PAIN: 0
HEADACHES: 0

## 2022-08-16 ASSESSMENT — COGNITIVE AND FUNCTIONAL STATUS - GENERAL
SUGGESTED CMS G CODE MODIFIER DAILY ACTIVITY: CH
SUGGESTED CMS G CODE MODIFIER MOBILITY: CH
DAILY ACTIVITIY SCORE: 24
MOBILITY SCORE: 24

## 2022-08-16 ASSESSMENT — LIFESTYLE VARIABLES
EVER HAD A DRINK FIRST THING IN THE MORNING TO STEADY YOUR NERVES TO GET RID OF A HANGOVER: NO
EVER FELT BAD OR GUILTY ABOUT YOUR DRINKING: NO
TOTAL SCORE: 0
HAVE YOU EVER FELT YOU SHOULD CUT DOWN ON YOUR DRINKING: NO
TOTAL SCORE: 0
AVERAGE NUMBER OF DAYS PER WEEK YOU HAVE A DRINK CONTAINING ALCOHOL: 0
CONSUMPTION TOTAL: NEGATIVE
HAVE PEOPLE ANNOYED YOU BY CRITICIZING YOUR DRINKING: NO
ALCOHOL_USE: NO
TOTAL SCORE: 0
ON A TYPICAL DAY WHEN YOU DRINK ALCOHOL HOW MANY DRINKS DO YOU HAVE: 0
HOW MANY TIMES IN THE PAST YEAR HAVE YOU HAD 5 OR MORE DRINKS IN A DAY: 0
SUBSTANCE_ABUSE: 0

## 2022-08-16 ASSESSMENT — PATIENT HEALTH QUESTIONNAIRE - PHQ9
2. FEELING DOWN, DEPRESSED, IRRITABLE, OR HOPELESS: NOT AT ALL
SUM OF ALL RESPONSES TO PHQ9 QUESTIONS 1 AND 2: 0
1. LITTLE INTEREST OR PLEASURE IN DOING THINGS: NOT AT ALL

## 2022-08-16 NOTE — H&P
Hospital Medicine History & Physical Note    Date of Service  8/16/2022    Primary Care Physician  Pcp Pt States None    Consultants      Specialist Names: None    Code Status  Full Code    Chief Complaint  Chief Complaint   Patient presents with    Chest Pain     PT reports onset of substernal CP and SOB at 1230 today. PT has had recurrent CP since dx of covid on 7/13. PT went to [facility] and EMS was called, 324 of ASA given.        History of Presenting Illness  Mariela Machado is a 62 y.o. female who presented 8/15/2022 with no significant past medical history who comes into the hospital complaining of chest pain and shortness of breath.  Patient recently got over COVID-19 on 7/19..  Since then she has been experiencing intermittent chest pain, and radiating to her left arm and down her left leg.  She is describes pain as pressure-like.  She would develop chest pain and shortness of breath on exertion.  Today at 12:00 she developed a sudden onset of worsening of her chest pain.  Associated with shortness of breath, dizziness, fatigue.  Patient states that she has been feeling intermittent fevers and diarrhea since her COVID.  She has been trying to do her daily activities but has been difficult to do so.  She is also noticed her lower extremity swelling increasing.  Patient is an ex-smoker.  She denies drinking alcohol or using recreational drugs.    Chest x-ray found no acute pulmonary process  CTA of the chest did not show any evidence of pulmonary embolism  CT of the abdomen found at 13 mm liver lesion and sclerotic lesions in the pelvis    EKG found sinus tachycardia, LVH with repolarization changes    I discussed the plan of care with patient.    Review of Systems  Review of Systems   Constitutional:  Positive for fever and malaise/fatigue. Negative for chills and diaphoresis.   HENT:  Negative for congestion, ear discharge, ear pain, hearing loss, nosebleeds, sinus pain, sore throat and  tinnitus.    Eyes:  Negative for blurred vision, double vision, photophobia and pain.   Respiratory:  Positive for cough and shortness of breath. Negative for hemoptysis, sputum production, wheezing and stridor.    Cardiovascular:  Positive for leg swelling. Negative for chest pain, palpitations, orthopnea, claudication and PND.   Gastrointestinal:  Negative for abdominal pain, blood in stool, constipation, diarrhea, heartburn, melena, nausea and vomiting.   Genitourinary:  Negative for dysuria, flank pain, frequency, hematuria and urgency.   Musculoskeletal:  Negative for back pain, falls, joint pain, myalgias and neck pain.   Skin:  Negative for itching and rash.   Neurological:  Negative for dizziness, tingling, tremors, weakness and headaches.   Endo/Heme/Allergies:  Negative for environmental allergies and polydipsia. Does not bruise/bleed easily.   Psychiatric/Behavioral:  Negative for depression, hallucinations, substance abuse and suicidal ideas.      Past Medical History   has a past medical history of Menopause and Vertigo.    Surgical History   has a past surgical history that includes hysterectomy laparoscopy.     Family History    Family history reviewed with patient. There is no family history that is pertinent to the chief complaint.     Social History   reports that she has never smoked. She has never used smokeless tobacco. She reports current alcohol use of about 1.2 oz per week. She reports that she does not use drugs.    Allergies  No Known Allergies    Medications  Prior to Admission Medications   Prescriptions Last Dose Informant Patient Reported? Taking?   B Complex Vitamins (B COMPLEX 1 PO)   Yes No   Sig: Take  by mouth.   Biotin 1000 MCG Tab  Patient Yes No   Sig: Take 1,000 mcg by mouth every day.   Omega-3 Fatty Acids (FISH OIL) 1000 MG Cap capsule  Patient Yes No   Sig: Take 1,000 mg by mouth every day.   Probiotic Product (PROBIOTIC-10 PO)   Yes No   Sig: Take  by mouth.   albuterol 108  (90 Base) MCG/ACT Aero Soln inhalation aerosol   No No   Sig: Inhale 2 Puffs every 6 hours as needed for Shortness of Breath.   cetirizine (ZYRTEC ALLERGY) 10 MG Tab   No No   Sig: Take 1 Tablet by mouth every day.   fluticasone (FLONASE) 50 MCG/ACT nasal spray   No No   Sig: Administer 1 Spray into affected nostril(S) every day.   predniSONE (DELTASONE) 20 MG Tab   No No   Si TAB BY MOUTH UP TO TWICE A DAY ONLY IF NEEDED FOR HEAD OR CHEST CONGESTION. TAKE WITH FOOD.      Facility-Administered Medications: None       Physical Exam  Temp:  [36.4 °C (97.5 °F)-37.7 °C (99.8 °F)] 37.7 °C (99.8 °F)  Pulse:  [] 73  Resp:  [9-38] 20  BP: (122-165)/(61-88) 122/61  SpO2:  [97 %-98 %] 97 %  Blood Pressure: 122/61   Temperature: 37.7 °C (99.8 °F)   Pulse: 73   Respiration: 20   Pulse Oximetry: 97 %       Physical Exam  Vitals and nursing note reviewed.   Constitutional:       General: She is not in acute distress.     Appearance: Normal appearance. She is not ill-appearing, toxic-appearing or diaphoretic.   HENT:      Head: Normocephalic and atraumatic.      Nose: No congestion or rhinorrhea.      Mouth/Throat:      Pharynx: No oropharyngeal exudate or posterior oropharyngeal erythema.   Eyes:      General: No scleral icterus.  Neck:      Vascular: No carotid bruit or JVD.   Cardiovascular:      Rate and Rhythm: Regular rhythm. Tachycardia present.      Pulses: Normal pulses.      Heart sounds: Normal heart sounds. No murmur heard.    No friction rub. No gallop.   Pulmonary:      Effort: Pulmonary effort is normal. No respiratory distress.      Breath sounds: No stridor. No wheezing, rhonchi or rales.   Abdominal:      General: Abdomen is flat. There is no distension.      Palpations: There is no mass.      Tenderness: There is no abdominal tenderness. There is no left CVA tenderness, guarding or rebound.      Hernia: No hernia is present.   Musculoskeletal:         General: No swelling. Normal range of motion.       Cervical back: No rigidity. No muscular tenderness.      Right lower leg: Edema present.      Left lower leg: Edema present.   Lymphadenopathy:      Cervical: No cervical adenopathy.   Skin:     General: Skin is warm and dry.      Capillary Refill: Capillary refill takes more than 3 seconds.      Coloration: Skin is not jaundiced or pale.      Findings: No bruising or erythema.   Neurological:      Mental Status: She is alert.       Laboratory:  Recent Labs     08/15/22  1531   WBC 10.2   RBC 4.94   HEMOGLOBIN 15.0   HEMATOCRIT 43.8   MCV 88.7   MCH 30.4   MCHC 34.2   RDW 41.0   PLATELETCT 335   MPV 9.7     Recent Labs     08/15/22  1531   SODIUM 140   POTASSIUM 3.5*   CHLORIDE 103   CO2 20   GLUCOSE 133*   BUN 6*   CREATININE 0.64   CALCIUM 9.9     Recent Labs     08/15/22  1531   ALTSGPT 20   ASTSGOT 21   ALKPHOSPHAT 73   TBILIRUBIN 0.8   GLUCOSE 133*         No results for input(s): NTPROBNP in the last 72 hours.      Recent Labs     08/15/22  1531   TROPONINT <6       Imaging:  CT-CTA CHEST PULMONARY ARTERY W/ RECONS   Final Result         1.  No pulmonary embolus appreciated.      CT-ABDOMEN-PELVIS WITH   Final Result         1.  Indeterminant lesion projecting from the inferior margin of the right hepatic lobe, recommend follow-up 3 phase CT liver for further characterization.   2.  Sclerotic foci in the right sacrum and right pelvis, appearance suggests bony islands, consider other sclerotic bony lesions with additional workup as clinically appropriate.   3.  Diverticulosis         DX-CHEST-PORTABLE (1 VIEW)   Final Result      No acute cardiopulmonary disease.      US-EXTREMITY VENOUS LOWER BILAT    (Results Pending)   CT-ABDOMEN LIVER FOR HEPATIC MASS (CIRRHOSIS)    (Results Pending)   NM-CARDIAC STRESS TEST    (Results Pending)       X-Ray:  I have personally reviewed the images and compared with prior images.  EKG:  I have personally reviewed the images and compared with prior  images.    Assessment/Plan:  Justification for Admission Status  I anticipate this patient is appropriate for observation status at this time because chest pain    Patient will need a Telemetry bed on TELEMETRY service .  The need is secondary to chest pain.    * Chest pain- (present on admission)  Assessment & Plan  Rule out ACS  Initial EKG and troponin negative for ischemia  Continuous cardiac monitoring with serial EKG and troponin  Nuclear medicine cardiac stress test in the morning if troponin remains negative  Patient has been given full dose of aspirin  Check lipid panel, TSH and hemoglobin A1c  Nitro when necessary for chest pain      Liver lesion  Assessment & Plan  With evidence of sclerosis on pelvic bone  Check AFP  CT scan with liver protocol has been ordered    Swelling of lower extremity  Assessment & Plan  Check venous Dopplers  Check cardiac echo  CTA of the chest was negative for pulmonary embolism    Fever  Assessment & Plan  Unknown etiology  Check procalcitonin  Check UA      Tachycardia  Assessment & Plan  Check cardiac echo  Check TSH      VTE prophylaxis: enoxaparin ppx

## 2022-08-16 NOTE — PROGRESS NOTES
Subjective:   Mariela Machado is a 62 y.o. female who presents for Chest Pain (Tighthnest in chest, L arm tingling armpit to elbow, L leg tingling, abdominal pain )      HPI:  Pleasant 62-year-old female presents to clinic for chest pain that started approximately 4 hours ago.  Patient states that she started having pressure-like chest pain that started very abruptly and associated left arm tingling, numbness, and diaphoresis.  She does report a history of COVID-19 1 month ago but is never had chest pain like this in the past.  She does report that her heart feels like it is racing.  She has not taken anything for symptoms.  She does not take any aspirin.  She typically does take an 81 mg aspirin per day but did not today for.  She reports that her chest pain is worse with exertion.  She states her chest pain is more mild currently right now but was much worse earlier today.  She denies fever, chills, blurred vision, double vision, palpitations, lower leg swelling, lower leg pain, shortness of breath, wheezing, cough, sputum production, nausea, vomiting, abdominal pain, diarrhea, dizziness, or headache.        Medications:    albuterol Aers  B COMPLEX 1 PO  Biotin Tabs  cetirizine Tabs  fish oil Caps  fluticasone  predniSONE Tabs  PROBIOTIC-10 PO    Allergies: Patient has no known allergies.    Problem List: Mariela Machado does not have any pertinent problems on file.    Surgical History:  Past Surgical History:   Procedure Laterality Date    HYSTERECTOMY LAPAROSCOPY         Past Social Hx: Mariela Machado  reports that she has never smoked. She has never used smokeless tobacco. She reports current alcohol use of about 1.2 oz per week. She reports that she does not use drugs.     Past Family Hx:  Mariela Machado family history is not on file.     Problem list, medications, and allergies reviewed by myself today in Epic.     Objective:     BP (!) 148/88 (BP Location: Left arm, Patient Position: Sitting, BP Cuff Size:  "Adult)   Pulse (!) 118   Temp 36.4 °C (97.5 °F) (Temporal)   Resp 18   Ht 1.727 m (5' 8\")   Wt 66 kg (145 lb 6.4 oz)   SpO2 97%   BMI 22.11 kg/m²     Physical Exam  Vitals reviewed.   Constitutional:       General: She is not in acute distress.     Appearance: Normal appearance.   HENT:      Head: Normocephalic.      Right Ear: Tympanic membrane, ear canal and external ear normal.      Left Ear: Tympanic membrane, ear canal and external ear normal.      Nose: Nose normal.      Mouth/Throat:      Mouth: Mucous membranes are moist.   Eyes:      Conjunctiva/sclera: Conjunctivae normal.      Pupils: Pupils are equal, round, and reactive to light.   Cardiovascular:      Rate and Rhythm: Normal rate and regular rhythm.      Pulses: Normal pulses.      Heart sounds: Normal heart sounds. No murmur heard.     Comments: No JVD or tracheal deviation.  Patient does report chest pressure currently in clinic.  Pulmonary:      Effort: Pulmonary effort is normal. No respiratory distress.      Breath sounds: Normal breath sounds. No stridor. No wheezing, rhonchi or rales.   Musculoskeletal:      Cervical back: Normal range of motion.      Right lower leg: No edema.      Left lower leg: No edema.   Lymphadenopathy:      Cervical: No cervical adenopathy.   Skin:     General: Skin is warm and dry.      Capillary Refill: Capillary refill takes less than 2 seconds.      Findings: No erythema, lesion or rash.   Neurological:      General: No focal deficit present.      Mental Status: She is alert and oriented to person, place, and time.       EKG interpretation: Sinus tach with a rate of 104.  There is ST depression in leads II, III, V4, aVF.  ST depression is concerning for ischemia.  No past EKG on file to compare with.    Assessment/Plan:     Diagnosis and associated orders:     1. Chest pain, unspecified type  EKG - Clinic Performed      2. Racing heart beat  EKG - Clinic Performed         Comments/MDM:     Patient presents " with 4 hours of chest pressure with associated left arm numbness, tingling, and sweating.  EKG did show ST depression in multiple leads concerning for possible heart strain/ischemia.  Patient is having active chest pain in clinic.  She was given 4 81 mg chewable aspirin while in clinic.  Discussed with patient differential diagnosis which includes ACS such as NSTEMI/STEMI, PE, pneumonia, viral illness, angina, unstable angina.  Given patient's presentation and physical exam findings, discussed with patient that she should be seen in the ER under higher level of care for further evaluation and management.  Patient is agreeable with this.  Patient is agreeable to EMS transport. [facility] was contacted and presented to the urgent care.  Report was given to EMS crew and patient was transferred into their care.         Differential diagnosis, natural history, supportive care, and indications for immediate follow-up discussed.    Advised the patient to follow-up with the primary care physician for recheck, reevaluation, and consideration of further management.    Please note that this dictation was created using voice recognition software. I have made a reasonable attempt to correct obvious errors, but I expect that there are errors of grammar and possibly content that I did not discover before finalizing the note.    Electronically signed by Jhonny Hester PA-C.

## 2022-08-16 NOTE — ASSESSMENT & PLAN NOTE
venous Dopplers negative  cardiac echo EF 65% no WMA  CTA of the chest was negative for pulmonary embolism  
Rule out ACS  Initial EKG and troponin negative for ischemia  Continuous cardiac monitoring with serial EKG and troponin  Nuclear medicine cardiac stress test ipending  Patient has been given full dose of aspirin  Check lipid panel, TSH and hemoglobin A1c  Nitro when necessary for chest pain    
Unknown etiology  Check procalcitonin  Check UA    
With evidence of sclerosis on pelvic bone  Check AFP  CT scan with liver protocol showing hemangioma  
resolved  
Ovarian cancer

## 2022-08-16 NOTE — DISCHARGE SUMMARY
Discharge Summary    CHIEF COMPLAINT ON ADMISSION  Chief Complaint   Patient presents with    Chest Pain     PT reports onset of substernal CP and SOB at 1230 today. PT has had recurrent CP since dx of covid on 7/13. PT went to [facility] and EMS was called, 324 of ASA given.        Reason for Admission  EMS     Admission Date  8/15/2022    CODE STATUS  Full Code    HPI & HOSPITAL COURSE  Mariela Machado is a 62 y.o. female who presented 8/15/2022 with no significant past medical history who comes into the hospital complaining of chest pain and shortness of breath.  Patient recently got over COVID-19 on 7/19..  Since then she has been experiencing intermittent chest pain, and radiating to her left arm and down her left leg.  She is describes pain as pressure-like.  She would develop chest pain and shortness of breath on exertion.  Today at 12:00 she developed a sudden onset of worsening of her chest pain.  Associated with shortness of breath, dizziness, fatigue.  Patient states that she has been feeling intermittent fevers and diarrhea since her COVID.  She has been trying to do her daily activities but has been difficult to do so.  She is also noticed her lower extremity swelling increasing.  Patient is an ex-smoker.  She denies drinking alcohol or using recreational drugs.     Chest x-ray found no acute pulmonary process  CTA of the chest did not show any evidence of pulmonary embolism  CT of the abdomen found at 13 mm liver lesion and sclerotic lesions in the pelvis     EKG found sinus tachycardia, LVH with repolarization changes    I saw and examined the patient this morning.  She denies any chest pain at this point.  Her troponin result was normal.  The patient is to have stress test later today and if it is negative she will be discharged home today.    In addition her COVID test came back positive.  She was instructed to continue to isolate herself at home until her symptom improved.  She will need  to follow-up with CT imaging for her lesion found on liver in 3 to 6 months time.    Please call [phone] to schedule PCP appointment for patient.    Required specialty appointments include:   As below    Therefore, she is discharged in fair and stable condition to home with close outpatient follow-up.        Discharge Date  08/16/22      FOLLOW UP ITEMS POST DISCHARGE      DISCHARGE DIAGNOSES  Principal Problem:    Chest pain POA: Yes  Active Problems:    Tachycardia POA: Unknown    Fever POA: Unknown    Swelling of lower extremity POA: Unknown    Liver lesion POA: Unknown  Resolved Problems:    * No resolved hospital problems. *      FOLLOW UP  Future Appointments   Date Time Provider Department Center   8/26/2022  9:00 AM [address]   10/25/2022 10:20 AM Cheryl M. Demucha, A.P.R.N[facility] [place] [place] [facility]A     PCP    Follow up in 1 week(s)        MEDICATIONS ON DISCHARGE     Medication List        CONTINUE taking these medications        Instructions   albuterol 108 (90 Base) MCG/ACT Aers inhalation aerosol   Inhale 2 Puffs every 6 hours as needed for Shortness of Breath.  Dose: 2 Puff     B COMPLEX 1 PO   Take 1 Tablet by mouth every day.  Dose: 1 Tablet     Biotin 1000 MCG Tabs   Take 1,000 mcg by mouth every day.  Dose: 1,000 mcg     cetirizine 10 MG Tabs  Commonly known as: ZyrTEC Allergy   Take 1 Tablet by mouth every day.  Dose: 10 mg     fish oil 1000 MG Caps capsule   Take 1,000 mg by mouth every day.  Dose: 1,000 mg     fluticasone 50 MCG/ACT nasal spray  Commonly known as: Flonase   Administer 1 Spray into affected nostril(S) every day.  Dose: 1 Spray     therapeutic multivitamin-minerals Tabs   Take 1 Tablet by mouth every day.  Dose: 1 Tablet     VITAMIN D PO   Take 1 Tablet by mouth every day.  Dose: 1 Tablet            STOP taking these medications      doxycycline 100 MG Tabs  Commonly known as: VIBRAMYCIN     predniSONE 20 MG Tabs  Commonly known as: DELTASONE     PROBIOTIC-10 PO               Allergies  No Known Allergies    DIET  Orders Placed This Encounter   Procedures    Diet Order Diet: Cardiac; Miscellaneous modifications: (optional): No Decaf, No Caffeine(for test)     Standing Status:   Standing     Number of Occurrences:   1     Order Specific Question:   Diet:     Answer:   Cardiac [6]     Order Specific Question:   Miscellaneous modifications: (optional)     Answer:   No Decaf, No Caffeine(for test) [11]       ACTIVITY  As tolerated.  Weight bearing as tolerated    CONSULTATIONS      PROCEDURES      LABORATORY  Lab Results   Component Value Date    SODIUM 143 08/16/2022    POTASSIUM 3.9 08/16/2022    CHLORIDE 106 08/16/2022    CO2 20 08/16/2022    GLUCOSE 109 (H) 08/16/2022    BUN 6 (L) 08/16/2022    CREATININE 0.66 08/16/2022        Lab Results   Component Value Date    WBC 10.6 08/16/2022    HEMOGLOBIN 14.2 08/16/2022    HEMATOCRIT 42.0 08/16/2022    PLATELETCT 340 08/16/2022        Total time of the discharge process exceeds 36 minutes.

## 2022-08-16 NOTE — ED TRIAGE NOTES
"Chief Complaint   Patient presents with    Chest Pain     PT reports onset of substernal CP and SOB at 1230 today. PT has had recurrent CP since dx of covid on 7/13. PT went to [facility] and EMS was called, 324 of ASA given.      BP (!) 165/81   Pulse (!) 121   Resp 20   Ht 1.727 m (5' 8\")   Wt 65.8 kg (145 lb)   SpO2 97%   BMI 22.05 kg/m²     PT changed into gown, connected to monitor. Chest Pain protocol initiated.  "

## 2022-08-16 NOTE — ED NOTES
Called [facility] to inquire on status of stress test. Per [facility], they cannot take COVID positive patients. Dr. Qureshi notified. Per Dr. Qureshi, pt to stay one more night. Pt updated.

## 2022-08-16 NOTE — ED NOTES
Report received from Carlyle FRANCOIS  Pt updated on POC  No needs at this time  Will continue to monitor.

## 2022-08-16 NOTE — ED PROVIDER NOTES
"ED Provider Note    CHIEF COMPLAINT  Chief Complaint   Patient presents with    Chest Pain     PT reports onset of substernal CP and SOB at 1230 today. PT has had recurrent CP since dx of covid on 7/13. PT went to [facility] and EMS was called, 324 of ASA given.        HPI  Mariela Machado is a 62 y.o. female who presents for evaluation of chest pain.  Patient notes the chest pain started around noon today while she was sitting at a desk and was sudden onset.  She notes its \"right in the center of my chest\" and has been waxing and waning throughout the day.  She notes that deep breathing exercises seem to help but the pain never goes away completely.  She notes she has had for episodes of this pain character and severity over the past month or so and it seems unprovoked as it was today.  She also notes that as the event started today, she felt the sudden urge to have a bowel movement and had profuse watery diarrhea right after the onset.  She notes this is happened other times as well.  She has had hernia repair in the past and has some form of mesh.  She notes no recent fevers or chills but feels the chest pain is closely associated with shortness of breath.  She also notes radiation left upper extremity especially the upper arm, and the left lower extremity from time to time. The symptoms have been intermittent since she was diagnosed with COVID in the middle of last month.  She notes no history of heart problems in the past and last had a stress test approximately 10 years ago.    REVIEW OF SYSTEMS  Constitutional: No fevers or chills  Skin: No rashes  HEENT: No sore throat, or runny nose  Neck: No neck pain, stiffness, or masses.  Chest: Chest pain noted.  Pulm: No shortness of breath, cough, wheezing, stridor, or pain with inspiration/expiration  Gastrointestinal: No nausea, vomiting, constipation, bloating, melena, hematochezia or abdominal pain.  Genitourinary: No dysuria or " "hematuria  Musculoskeletal: No pain, swelling, weakness  Neurologic: No sensory or motor changes. No confusion or disorientation.  Heme: No bleeding or bruising problems.   Immuno: No hx of recurrent infections    PAST FAM HISTORY  No family history on file.    PAST MEDICAL HISTORY   has a past medical history of Menopause and Vertigo.    SOCIAL HISTORY  Social History     Tobacco Use    Smoking status: Never    Smokeless tobacco: Never   Vaping Use    Vaping Use: Never used   Substance and Sexual Activity    Alcohol use: Yes     Alcohol/week: 1.2 oz     Types: 2 Cans of beer per week    Drug use: No    Sexual activity: Yes     Partners: Male       SURGICAL HISTORY   has a past surgical history that includes hysterectomy laparoscopy.    CURRENT MEDICATIONS  Home Medications       Reviewed by Brian Caballero (Pharmacy Tech) on 08/16/22 at 0114  Med List Status: Complete     Medication Last Dose Status   albuterol 108 (90 Base) MCG/ACT Aero Soln inhalation aerosol PRN Active   B Complex Vitamins (B COMPLEX 1 PO) 8/15/2022 Active   Biotin 1000 MCG Tab 8/15/2022 Active   cetirizine (ZYRTEC ALLERGY) 10 MG Tab 8/15/2022 Active   doxycycline (VIBRAMYCIN) 100 MG Tab 7/26/2022 Active   fluticasone (FLONASE) 50 MCG/ACT nasal spray >2 weeks Active   Omega-3 Fatty Acids (FISH OIL) 1000 MG Cap capsule 8/15/2022 Active   predniSONE (DELTASONE) 20 MG Tab 8/15/2022 Active   Probiotic Product (PROBIOTIC-10 PO) 8/15/2022 Active   therapeutic multivitamin-minerals (THERAGRAN-M) Tab 8/15/2022 Active   VITAMIN D PO 8/15/2022 Active                    ALLERGIES  No Known Allergies    PHYSICAL EXAM  VITAL SIGNS: /61   Pulse 73   Temp 37.7 °C (99.8 °F) (Oral)   Resp 20   Ht 1.727 m (5' 8\")   Wt 65.8 kg (145 lb)   SpO2 97%   BMI 22.05 kg/m²    Gen: Alert in no apparent distress.  HEENT: No signs of trauma, Bilateral external ears normal, Nose normal. Conjunctiva normal, Non-icteric.   Cardiovascular: Regular rate and " rhythm, no murmurs.  Capillary refill less than 3 seconds to all extremities, 2+ distal pulses.  Thorax & Lungs: Normal breath sounds, No respiratory distress, No wheezing bilateral chest rise  Abdomen: Bowel sounds normal, Soft, No tenderness, No masses, No pulsatile masses. No Guarding or rebound  Skin: Warm, Dry, No erythema, No rash noted to exposed areas.   Back: No bony tenderness, No CVA tenderness.   Extremities: Intact distal pulses, No edema  Neurologic: Alert , no facial droop, grossly normal coordination and strength  Psychiatric: Affect pleasant      LABS  Results for orders placed or performed during the hospital encounter of 08/15/22   CBC with Differential   Result Value Ref Range    WBC 10.2 4.8 - 10.8 K/uL    RBC 4.94 4.20 - 5.40 M/uL    Hemoglobin 15.0 12.0 - 16.0 g/dL    Hematocrit 43.8 37.0 - 47.0 %    MCV 88.7 81.4 - 97.8 fL    MCH 30.4 27.0 - 33.0 pg    MCHC 34.2 33.6 - 35.0 g/dL    RDW 41.0 35.9 - 50.0 fL    Platelet Count 335 164 - 446 K/uL    MPV 9.7 9.0 - 12.9 fL    Neutrophils-Polys 85.40 (H) 44.00 - 72.00 %    Lymphocytes 12.90 (L) 22.00 - 41.00 %    Monocytes 1.10 0.00 - 13.40 %    Eosinophils 0.00 0.00 - 6.90 %    Basophils 0.20 0.00 - 1.80 %    Immature Granulocytes 0.40 0.00 - 0.90 %    Nucleated RBC 0.00 /100 WBC    Neutrophils (Absolute) 8.73 (H) 2.00 - 7.15 K/uL    Lymphs (Absolute) 1.32 1.00 - 4.80 K/uL    Monos (Absolute) 0.11 0.00 - 0.85 K/uL    Eos (Absolute) 0.00 0.00 - 0.51 K/uL    Baso (Absolute) 0.02 0.00 - 0.12 K/uL    Immature Granulocytes (abs) 0.04 0.00 - 0.11 K/uL    NRBC (Absolute) 0.00 K/uL   Complete Metabolic Panel (CMP)   Result Value Ref Range    Sodium 140 135 - 145 mmol/L    Potassium 3.5 (L) 3.6 - 5.5 mmol/L    Chloride 103 96 - 112 mmol/L    Co2 20 20 - 33 mmol/L    Anion Gap 17.0 (H) 7.0 - 16.0    Glucose 133 (H) 65 - 99 mg/dL    Bun 6 (L) 8 - 22 mg/dL    Creatinine 0.64 0.50 - 1.40 mg/dL    Calcium 9.9 8.5 - 10.5 mg/dL    AST(SGOT) 21 12 - 45 U/L     ALT(SGPT) 20 2 - 50 U/L    Alkaline Phosphatase 73 30 - 99 U/L    Total Bilirubin 0.8 0.1 - 1.5 mg/dL    Albumin 5.0 (H) 3.2 - 4.9 g/dL    Total Protein 7.7 6.0 - 8.2 g/dL    Globulin 2.7 1.9 - 3.5 g/dL    A-G Ratio 1.9 g/dL   Troponin   Result Value Ref Range    Troponin T <6 6 - 19 ng/L   LACTIC ACID   Result Value Ref Range    Lactic Acid 1.7 0.5 - 2.0 mmol/L   ESTIMATED GFR   Result Value Ref Range    GFR (CKD-EPI) 100 >60 mL/min/1.73 m 2   EKG   Result Value Ref Range    Report       [facility] Emergency Dept.    Test Date:  2022-08-15  Pt Name:    DEVYN DIEHL               Department: ER  MRN:        [id number]                      Room:       [facility]  Gender:     Female                       Technician: [id number]  :        [id number]                   Requested By:ER TRIAGE PROTOCOL  Order #:    [id number]                    Reading MD:    Measurements  Intervals                                Axis  Rate:       105                          P:          75  ND:         146                          QRS:        49  QRSD:       93                           T:          -20  QT:         363  QTc:        480    Interpretive Statements  Sinus tachycardia  Abnormal R-wave progression, early transition  Nonspecific repol abnormality, diffuse leads  No previous ECG available for comparison         RADIOLOGY  CT-CTA CHEST PULMONARY ARTERY W/ RECONS   Final Result         1.  No pulmonary embolus appreciated.      CT-ABDOMEN-PELVIS WITH   Final Result         1.  Indeterminant lesion projecting from the inferior margin of the right hepatic lobe, recommend follow-up 3 phase CT liver for further characterization.   2.  Sclerotic foci in the right sacrum and right pelvis, appearance suggests bony islands, consider other sclerotic bony lesions with additional workup as clinically appropriate.   3.  Diverticulosis         DX-CHEST-PORTABLE (1 VIEW)   Final Result      No acute cardiopulmonary disease.       US-EXTREMITY VENOUS LOWER BILAT    (Results Pending)   CT-ABDOMEN LIVER FOR HEPATIC MASS (CIRRHOSIS)    (Results Pending)   NM-CARDIAC STRESS TEST    (Results Pending)   EC-ECHOCARDIOGRAM COMPLETE W/O CONT    (Results Pending)         COURSE & MEDICAL DECISION MAKING  Patient arrives for evaluation of what appears to be atypical chest pain which has been episodic since last month and comes on unprovoked.  She has had the pain constantly since around 12 or 1230 this afternoon but it has been waxing and waning.  She notes no history of MI or ACS but has not had any provocative testing in around 10 years.  Her EKG is markedly abnormal but there is no previous EKG to compare.    Patient's labs are reassuring and her CTs do not demonstrate any significant or acute pathology but did demonstrate some incidental findings such as sclerotic bony lesions and an indeterminate lesion in the inferior margin of the right hepatic lobe.  Based on the patient's abnormal EKG, concerning symptoms, and lack of any other convincing findings, she will be admitted to the hospitalist service for provocative testing.  Patient states understanding of this and is comfortable with this plan.    FINAL IMPRESSION  1. Other chest pain        Electronically signed by: Jamey Quevedo M.D., 8/15/2022 6:48 PM

## 2022-08-16 NOTE — ED NOTES
Report received from PRISCILA Crowe. Pt is A&Ox4 and awake in bed. Pt on cardiac monitor, pulse ox, and automatic BP. VSS, saturating above 95% on RA, SR in the 90s. Pt complains of 0/10 pain at this time. Call light within reach and pt updated on POC.

## 2022-08-17 ENCOUNTER — APPOINTMENT (OUTPATIENT)
Dept: RADIOLOGY | Facility: MEDICAL CENTER | Age: 62
End: 2022-08-17
Attending: HOSPITALIST
Payer: COMMERCIAL

## 2022-08-17 LAB
AFP-TM SERPL-MCNC: 3 NG/ML (ref 0–9)
EKG IMPRESSION: NORMAL
EKG IMPRESSION: NORMAL

## 2022-08-17 PROCEDURE — A9270 NON-COVERED ITEM OR SERVICE: HCPCS | Performed by: HOSPITALIST

## 2022-08-17 PROCEDURE — A9502 TC99M TETROFOSMIN: HCPCS

## 2022-08-17 PROCEDURE — 700111 HCHG RX REV CODE 636 W/ 250 OVERRIDE (IP)

## 2022-08-17 PROCEDURE — 700102 HCHG RX REV CODE 250 W/ 637 OVERRIDE(OP): Performed by: HOSPITALIST

## 2022-08-17 PROCEDURE — 93005 ELECTROCARDIOGRAM TRACING: CPT | Performed by: HOSPITALIST

## 2022-08-17 PROCEDURE — 99225 PR SUBSEQUENT OBSERVATION CARE,LEVEL II: CPT | Performed by: HOSPITALIST

## 2022-08-17 PROCEDURE — 93010 ELECTROCARDIOGRAM REPORT: CPT | Performed by: INTERNAL MEDICINE

## 2022-08-17 PROCEDURE — G0378 HOSPITAL OBSERVATION PER HR: HCPCS

## 2022-08-17 RX ORDER — REGADENOSON 0.08 MG/ML
INJECTION, SOLUTION INTRAVENOUS
Status: COMPLETED
Start: 2022-08-17 | End: 2022-08-17

## 2022-08-17 RX ADMIN — LORAZEPAM 0.5 MG: 1 TABLET ORAL at 08:08

## 2022-08-17 RX ADMIN — ASPIRIN 325 MG: 325 TABLET ORAL at 04:06

## 2022-08-17 RX ADMIN — REGADENOSON 0.4 MG: 0.08 INJECTION, SOLUTION INTRAVENOUS at 15:07

## 2022-08-17 ASSESSMENT — PAIN DESCRIPTION - PAIN TYPE: TYPE: ACUTE PAIN

## 2022-08-17 NOTE — INFECTION CONTROL
This patient is considered COVID RECOVERED.    Patient initially tested positive for COVID on 7/19/2022 .  Patient is greater than or equal to 15 days from symptom onset and/or positive test, with symptom improvement.  Per the CDC guidance, this patient no longer requires transmission based precautions.  Patient may be placed on any unit per the bed assignment policy, including placement in a semi-private room with a roommate.

## 2022-08-17 NOTE — CARE PLAN
The patient is Stable - Low risk of patient condition declining or worsening    Shift Goals  Clinical Goals: monitor VSS; monitor for worsening chest pain  Patient Goals: get stress test done  Family Goals: n/a    Progress made toward(s) clinical / shift goals:  no complaint of chest pain overnight.   Problem: Knowledge Deficit - Standard  Goal: Patient and family/care givers will demonstrate understanding of plan of care, disease process/condition, diagnostic tests and medications  Outcome: Progressing  Note: Patient updated on plan to monitor overnight and discharge in AM

## 2022-08-17 NOTE — PROGRESS NOTES
Patient arrived to unit with ER RN.  Ambulated to hospital bed from [facility].  Steady gait.  Belongings at bedside.  Aox4.  Oriented to room and to use call light for assistance.  VSS.

## 2022-08-17 NOTE — PROGRESS NOTES
Patient complaining of heart palpitations and lightheadedness. Stated she had just gone to bathroom. Dr. Breen notified and aware. EKG obtained. Continuing to monitor.

## 2022-08-17 NOTE — PROGRESS NOTES
4 Eyes Skin Assessment Completed by PRISCILA Wilkins and PRISCILA Bright.    Head WDL  Ears WDL  Nose WDL  Mouth WDL  Neck WDL  Breast/Chest WDL  Shoulder Blades WDL  Spine WDL  (R) Arm/Elbow/Hand WDL  (L) Arm/Elbow/Hand WDL  Abdomen Scar  Groin WDL  Scrotum/Coccyx/Buttocks WDL  (R) Leg WDL  (L) Leg WDL  (R) Heel/Foot/Toe WDL  (L) Heel/Foot/Toe WDL          Devices In Places Tele Box      Interventions In Place Pillows    Possible Skin Injury No    Pictures Uploaded Into Epic N/A  Wound Consult Placed N/A  RN Wound Prevention Protocol Ordered No

## 2022-08-17 NOTE — ED NOTES
Phone report to PRISCILA Wilkins. All questions answered. Pt transported off unit with all belongings. Pt awake and breathing with even, unlabored respirations on RA at time of transfer.

## 2022-08-17 NOTE — PROGRESS NOTES
Unable to perform cardiac stress test on positive Covid patient unless cleared by infectious disease.  Unable to isolate in department.

## 2022-08-17 NOTE — DISCHARGE SUMMARY
Discharge Summary    CHIEF COMPLAINT ON ADMISSION  Chief Complaint   Patient presents with    Chest Pain     PT reports onset of substernal CP and SOB at 1230 today. PT has had recurrent CP since dx of covid on 7/13. PT went to [facility] and EMS was called, 324 of ASA given.        Reason for Admission  EMS     Admission Date  8/15/2022    CODE STATUS  Full Code    HPI & HOSPITAL COURSE  This is a 62 y.o. female past medical history of COVID in July/2022 here with presenting with chest pain and shortness of breath.  On arrival her troponins were negative, chest x-ray did not show any acute pulmonary process, CTA of the chest did not show any PE, CT of the abdomen showed 13 mm liver lesion and sclerotic lesions in the pelvis after which a CT liver was done which demonstrated that lesion was a hemangioma.  EKG did not show any acute ST/T wave ischemic changes.  She was cleared by infection prevention as her COVID returned positive.  Her stress test was negative. I discussed all findings with patient. She is concerned of her ongoing palpitations, I advised her to f/u with PCp for ziopatch.      Therefore, she is discharged in good and stable condition to home with close outpatient follow-up.      Discharge Date  8/18/22    FOLLOW UP ITEMS POST DISCHARGE  F/u with PCP as needed  Consider ziopatch for her palpitations    DISCHARGE DIAGNOSES  Principal Problem:    Chest pain POA: Yes  Active Problems:    Tachycardia POA: Unknown    Fever POA: Unknown    Swelling of lower extremity POA: Unknown    Liver lesion POA: Unknown  Resolved Problems:    * No resolved hospital problems. *      FOLLOW UP  Future Appointments   Date Time Provider Department Center   8/26/2022  9:00 AM Ortiz MORALES [address]   10/25/2022 10:20 AM Cheryl M. Demucha, A.P.R.N. [facility]     PCP    Follow up in 1 week(s)        MEDICATIONS ON DISCHARGE     Medication List        CONTINUE taking these medications        Instructions    albuterol 108 (90 Base) MCG/ACT Aers inhalation aerosol   Inhale 2 Puffs every 6 hours as needed for Shortness of Breath.  Dose: 2 Puff     B COMPLEX 1 PO   Take 1 Tablet by mouth every day.  Dose: 1 Tablet     Biotin 1000 MCG Tabs   Take 1,000 mcg by mouth every day.  Dose: 1,000 mcg     cetirizine 10 MG Tabs  Commonly known as: ZyrTEC Allergy   Take 1 Tablet by mouth every day.  Dose: 10 mg     fish oil 1000 MG Caps capsule   Take 1,000 mg by mouth every day.  Dose: 1,000 mg     fluticasone 50 MCG/ACT nasal spray  Commonly known as: Flonase   Administer 1 Spray into affected nostril(S) every day.  Dose: 1 Spray     therapeutic multivitamin-minerals Tabs   Take 1 Tablet by mouth every day.  Dose: 1 Tablet     VITAMIN D PO   Take 1 Tablet by mouth every day.  Dose: 1 Tablet            STOP taking these medications      doxycycline 100 MG Tabs  Commonly known as: VIBRAMYCIN     predniSONE 20 MG Tabs  Commonly known as: DELTASONE     PROBIOTIC-10 PO              Allergies  No Known Allergies    DIET  Orders Placed This Encounter   Procedures    Diet NPO Restrict to: Sips with Medications     Standing Status:   Standing     Number of Occurrences:   1     Order Specific Question:   Diet NPO Restrict to:     Answer:   Sips with Medications [3]       ACTIVITY  As tolerated.  Weight bearing as tolerated    CONSULTATIONS  none    PROCEDURES  none    LABORATORY  Lab Results   Component Value Date    SODIUM 143 08/16/2022    POTASSIUM 3.9 08/16/2022    CHLORIDE 106 08/16/2022    CO2 20 08/16/2022    GLUCOSE 109 (H) 08/16/2022    BUN 6 (L) 08/16/2022    CREATININE 0.66 08/16/2022        Lab Results   Component Value Date    WBC 10.6 08/16/2022    HEMOGLOBIN 14.2 08/16/2022    HEMATOCRIT 42.0 08/16/2022    PLATELETCT 340 08/16/2022        Total time of the discharge process exceeds 28 minutes.

## 2022-08-18 VITALS
HEIGHT: 68 IN | TEMPERATURE: 97.1 F | DIASTOLIC BLOOD PRESSURE: 70 MMHG | SYSTOLIC BLOOD PRESSURE: 112 MMHG | HEART RATE: 87 BPM | OXYGEN SATURATION: 95 % | BODY MASS INDEX: 21.98 KG/M2 | WEIGHT: 145 LBS | RESPIRATION RATE: 17 BRPM

## 2022-08-18 PROCEDURE — 700102 HCHG RX REV CODE 250 W/ 637 OVERRIDE(OP): Performed by: HOSPITALIST

## 2022-08-18 PROCEDURE — G0378 HOSPITAL OBSERVATION PER HR: HCPCS

## 2022-08-18 PROCEDURE — 99217 PR OBSERVATION CARE DISCHARGE: CPT | Performed by: HOSPITALIST

## 2022-08-18 PROCEDURE — A9270 NON-COVERED ITEM OR SERVICE: HCPCS | Performed by: HOSPITALIST

## 2022-08-18 RX ADMIN — ASPIRIN 325 MG: 325 TABLET ORAL at 04:40

## 2022-08-18 ASSESSMENT — ENCOUNTER SYMPTOMS
CHILLS: 0
VOMITING: 0
NAUSEA: 0
COUGH: 0
FEVER: 0
ABDOMINAL PAIN: 0
SHORTNESS OF BREATH: 0
DIZZINESS: 0
HEADACHES: 0
PALPITATIONS: 0

## 2022-08-18 NOTE — PROGRESS NOTES
IV removed; discharge paperwork reviewed with Mariela, and all questions answered.  Sister is coming to transport Mariela home.

## 2022-08-18 NOTE — PROGRESS NOTES
Hospital Medicine Daily Progress Note    Date of Service  8/18/2022    Chief Complaint  Mariela Machado is a 62 y.o. female admitted 8/15/2022 with chest pain    Hospital Course  No notes on file    Interval Problem Update  BIANKA overnight  Covid cleared per infection prevention  No more chest pain  Pending stress test    I have discussed this patient's plan of care and discharge plan at IDT rounds today with Case Management, Nursing, Nursing leadership, and other members of the IDT team.    Consultants/Specialty  none    Code Status  Full Code    Disposition  Patient is medically cleared for discharge.   Anticipate discharge to to home with close outpatient follow-up.  I have placed the appropriate orders for post-discharge needs.    Review of Systems  Review of Systems   Constitutional:  Negative for chills and fever.   Respiratory:  Negative for cough and shortness of breath.    Cardiovascular:  Negative for chest pain and palpitations.   Gastrointestinal:  Negative for abdominal pain, nausea and vomiting.   Genitourinary:  Negative for dysuria and urgency.   Neurological:  Negative for dizziness and headaches.   All other systems reviewed and are negative.     Physical Exam  Temp:  [36.1 °C (97 °F)-36.8 °C (98.2 °F)] 36.2 °C (97.1 °F)  Pulse:  [71-87] 87  Resp:  [14-18] 17  BP: ()/(54-70) 112/70  SpO2:  [92 %-97 %] 95 %    Physical Exam  Vitals and nursing note reviewed.   Constitutional:       Appearance: Normal appearance.   Cardiovascular:      Rate and Rhythm: Normal rate and regular rhythm.   Pulmonary:      Effort: Pulmonary effort is normal.      Breath sounds: Normal breath sounds.   Skin:     General: Skin is warm and dry.   Neurological:      General: No focal deficit present.      Mental Status: She is alert and oriented to person, place, and time.       Fluids  No intake or output data in the 24 hours ending 08/18/22 1109    Laboratory  Recent Labs     08/15/22  1531 08/16/22  0210   WBC 10.2  10.6   RBC 4.94 4.71   HEMOGLOBIN 15.0 14.2   HEMATOCRIT 43.8 42.0   MCV 88.7 89.2   MCH 30.4 30.1   MCHC 34.2 33.8   RDW 41.0 42.2   PLATELETCT 335 340   MPV 9.7 10.0     Recent Labs     08/15/22  1531 08/16/22  0210   SODIUM 140 143   POTASSIUM 3.5* 3.9   CHLORIDE 103 106   CO2 20 20   GLUCOSE 133* 109*   BUN 6* 6*   CREATININE 0.64 0.66   CALCIUM 9.9 9.3                   Imaging  NM-CARDIAC STRESS TEST   Final Result      CT-ABDOMEN LIVER FOR HEPATIC MASS (CIRRHOSIS)   Final Result      14 mm exophytic right hepatic hemangioma which requires no additional follow-up      8 mm right hepatic simple cyst      LI-RADS: LR-1: definitely benign      EC-ECHOCARDIOGRAM COMPLETE W/O CONT   Final Result      US-EXTREMITY VENOUS LOWER BILAT   Final Result      CT-CTA CHEST PULMONARY ARTERY W/ RECONS   Final Result         1.  No pulmonary embolus appreciated.      CT-ABDOMEN-PELVIS WITH   Final Result         1.  Indeterminant lesion projecting from the inferior margin of the right hepatic lobe, recommend follow-up 3 phase CT liver for further characterization.   2.  Sclerotic foci in the right sacrum and right pelvis, appearance suggests bony islands, consider other sclerotic bony lesions with additional workup as clinically appropriate.   3.  Diverticulosis         DX-CHEST-PORTABLE (1 VIEW)   Final Result      No acute cardiopulmonary disease.           Assessment/Plan  * Chest pain- (present on admission)  Assessment & Plan  Rule out ACS  Initial EKG and troponin negative for ischemia  Continuous cardiac monitoring with serial EKG and troponin  Nuclear medicine cardiac stress test ipending  Patient has been given full dose of aspirin  Check lipid panel, TSH and hemoglobin A1c  Nitro when necessary for chest pain      Liver lesion  Assessment & Plan  With evidence of sclerosis on pelvic bone  Check AFP  CT scan with liver protocol showing hemangioma    Swelling of lower extremity  Assessment & Plan   venous Dopplers  negative  cardiac echo EF 65% no WMA  CTA of the chest was negative for pulmonary embolism    Fever  Assessment & Plan  Unknown etiology  Check procalcitonin  Check UA      Tachycardia  Assessment & Plan  resolved       VTE prophylaxis: SCDs/TEDs    I have performed a physical exam and reviewed and updated ROS and Plan today (8/18/2022). In review of yesterday's note (8/17/2022), there are no changes except as documented above.

## 2022-08-18 NOTE — PROGRESS NOTES
Per Dr. Breen, patient to dc home after stress test resulted. Patient stated she did not have a ride home until tomorrow morning. She stated her sister would pick her up first thing tomorrow morning. Offered patient taxi, however, patient refusing other forms of transport. On call Hospitalist notified and aware. Charge RN notified and aware. Patient to dc first thing tomorrow AM.

## 2022-08-24 ENCOUNTER — HOSPITAL ENCOUNTER (OUTPATIENT)
Dept: LAB | Facility: MEDICAL CENTER | Age: 62
End: 2022-08-24
Attending: NURSE PRACTITIONER
Payer: COMMERCIAL

## 2022-08-24 ENCOUNTER — OFFICE VISIT (OUTPATIENT)
Dept: MEDICAL GROUP | Facility: PHYSICIAN GROUP | Age: 62
End: 2022-08-24
Payer: COMMERCIAL

## 2022-08-24 VITALS
OXYGEN SATURATION: 98 % | HEIGHT: 68 IN | WEIGHT: 145.6 LBS | BODY MASS INDEX: 22.07 KG/M2 | DIASTOLIC BLOOD PRESSURE: 100 MMHG | TEMPERATURE: 97.1 F | SYSTOLIC BLOOD PRESSURE: 136 MMHG | RESPIRATION RATE: 16 BRPM | HEART RATE: 93 BPM

## 2022-08-24 DIAGNOSIS — Z12.11 SCREEN FOR COLON CANCER: ICD-10-CM

## 2022-08-24 DIAGNOSIS — R00.0 TACHYCARDIA: ICD-10-CM

## 2022-08-24 DIAGNOSIS — E55.9 VITAMIN D DEFICIENCY: ICD-10-CM

## 2022-08-24 DIAGNOSIS — I10 PRIMARY HYPERTENSION: ICD-10-CM

## 2022-08-24 DIAGNOSIS — E78.49 OTHER HYPERLIPIDEMIA: ICD-10-CM

## 2022-08-24 DIAGNOSIS — Z12.31 ENCOUNTER FOR SCREENING MAMMOGRAM FOR MALIGNANT NEOPLASM OF BREAST: ICD-10-CM

## 2022-08-24 DIAGNOSIS — R00.2 PALPITATIONS: ICD-10-CM

## 2022-08-24 DIAGNOSIS — T78.40XA ALLERGY, INITIAL ENCOUNTER: ICD-10-CM

## 2022-08-24 DIAGNOSIS — K63.5 POLYP OF COLON, UNSPECIFIED PART OF COLON, UNSPECIFIED TYPE: ICD-10-CM

## 2022-08-24 DIAGNOSIS — R07.9 CHEST PAIN, UNSPECIFIED TYPE: ICD-10-CM

## 2022-08-24 DIAGNOSIS — Z86.16 HISTORY OF COVID-19: ICD-10-CM

## 2022-08-24 LAB
25(OH)D3 SERPL-MCNC: 34 NG/ML (ref 30–100)
CHOLEST SERPL-MCNC: 261 MG/DL (ref 100–199)
FASTING STATUS PATIENT QL REPORTED: NORMAL
HDLC SERPL-MCNC: 64 MG/DL
LDLC SERPL CALC-MCNC: 164 MG/DL
TRIGL SERPL-MCNC: 163 MG/DL (ref 0–149)

## 2022-08-24 PROCEDURE — 80061 LIPID PANEL: CPT

## 2022-08-24 PROCEDURE — 99214 OFFICE O/P EST MOD 30 MIN: CPT | Performed by: NURSE PRACTITIONER

## 2022-08-24 PROCEDURE — 36415 COLL VENOUS BLD VENIPUNCTURE: CPT

## 2022-08-24 PROCEDURE — 82306 VITAMIN D 25 HYDROXY: CPT

## 2022-08-24 RX ORDER — NITROGLYCERIN 0.3 MG/1
TABLET SUBLINGUAL
Qty: 100 TABLET | Refills: 1 | Status: SHIPPED | OUTPATIENT
Start: 2022-08-24

## 2022-08-24 RX ORDER — AZELASTINE 1 MG/ML
1 SPRAY, METERED NASAL 2 TIMES DAILY
Qty: 30 ML | Refills: 6 | Status: SHIPPED | OUTPATIENT
Start: 2022-08-24 | End: 2022-09-14

## 2022-08-24 RX ORDER — LISINOPRIL 20 MG/1
10 TABLET ORAL DAILY
Qty: 30 TABLET | Refills: 6 | Status: SHIPPED | OUTPATIENT
Start: 2022-08-24

## 2022-08-24 ASSESSMENT — FIBROSIS 4 INDEX: FIB4 SCORE: 0.71

## 2022-08-24 NOTE — PROGRESS NOTES
"Subjective:     CC:   Chief Complaint   Patient presents with    Establish Care     Former pt of [facility]    Follow-Up     Was in ER on 8/16       HPI:   Mariela presents today with    Chest pain  Pt was admitted to [facility] 8/15-8/18  Notes indicate:   This is a 62 y.o. female past medical history of COVID in July/2022 here with presenting with chest pain and shortness of breath.  On arrival her troponins were negative, chest x-ray did not show any acute pulmonary process, CTA of the chest did not show any PE, CT of the abdomen showed 13 mm liver lesion and sclerotic lesions in the pelvis after which a CT liver was done which demonstrated that lesion was a hemangioma.  EKG did not show any acute ST/T wave ischemic changes.  She was cleared by infection prevention as her COVID returned positive.  Her stress test was negative. I discussed all findings with patient. She is concerned of her ongoing palpitations, I advised her to f/u with PCp for ziopatch.    Pro  during hospital stay     Pt reports feeling better taking aspirin     Allergies  Reports year round allergies   Used to use neti pot but taking zyrtec currently  Worse since moving from [place] 7 years  Never been tested  Nasal stuffiness; flonase caused rhinorrhea in the past             ROS per HPI    Objective:     Exam:  BP (!) 136/100 (BP Location: Right arm, Patient Position: Sitting, BP Cuff Size: Adult)   Pulse 93   Temp 36.2 °C (97.1 °F) (Temporal)   Resp 16   Ht 1.727 m (5' 8\")   Wt 66 kg (145 lb 9.6 oz)   SpO2 98%   BMI 22.14 kg/m²  Body mass index is 22.14 kg/m².    Physical Exam:  Constitutional: Well-developed and well-nourished female Not diaphoretic. No distress.   Skin: warm, dry, intact, no evidence of rash or concerning lesions  Head: Atraumatic without lesions.  Eyes: Conjunctivae and extraocular motions are normal. Pupils are equal, round. No scleral icterus.   Ears:  External ears unremarkable.   Neck: Supple, trachea " midline. No thyromegaly present. No cervical or supraclavicular lymphadenopathy.  Cardiovascular: Regular rate and rhythm without murmur.   Pulmonary: Clear to ausculation. Normal effort. No rales, ronchi, or wheezing.  Extremities: No cyanosis, clubbing, erythema, nor edema.   Neurological: Alert and oriented x 3.   Psychiatric:  Behavior, mood, and affect are appropriate.        Assessment & Plan:     62 y.o. female with the following -     1. Chest pain, unspecified type  - Cardiac Event Monitor; Future  - REFERRAL TO CARDIOLOGY  - nitroGLYCERIN (NITROSTAT) 0.3 MG SL tablet; Place 1 tab under tongue at 1st sign of chest pain; may repeat every 5 min for total of 3 doses  Dispense: 100 Tablet; Refill: 1    2. Tachycardia  - Cardiac Event Monitor; Future  - REFERRAL TO CARDIOLOGY    3. Palpitations  - Cardiac Event Monitor; Future  - REFERRAL TO CARDIOLOGY    4. Vitamin D deficiency  - VITAMIN D,25 HYDROXY; Future    5. Other hyperlipidemia  - Lipid Profile; Future    6. Primary hypertension    7. Allergy, initial encounter  - azelastine (ASTELIN) 137 MCG/SPRAY nasal spray; Administer 1 Spray into affected nostril(S) 2 times a day.  Dispense: 30 mL; Refill: 6    8. History of COVID-19    9. Encounter for screening mammogram for malignant neoplasm of breast  - MA-SCREENING MAMMO BILAT W/TOMOSYNTHESIS W/CAD; Future    10. Screen for colon cancer  - Referral to GI for Colonoscopy    11. Polyp of colon, unspecified part of colon, unspecified type  - Referral to GI for Colonoscopy    Other orders  - lisinopril (PRINIVIL) 20 MG Tab; Take 0.5 Tablets by mouth every day. Increase to full tab if needed to maintain bp of <130/90  Dispense: 30 Tablet; Refill: 6       Return in about 4 weeks (around 9/21/2022) for lab results.    Please note that this dictation was created using voice recognition software. I have made every reasonable attempt to correct obvious errors, but I expect that there are errors of grammar and possibly  content that I did not discover before finalizing the note.

## 2022-08-24 NOTE — ASSESSMENT & PLAN NOTE
Reports year round allergies   Used to use neti pot but taking zyrtec currently  Worse since moving from [place] 7 years  Never been tested  Nasal stuffiness; flonase caused rhinorrhea in the past

## 2022-08-24 NOTE — ASSESSMENT & PLAN NOTE
Pt was admitted to [facility] 8/15-8/18  Notes indicate:   This is a 62 y.o. female past medical history of COVID in July/2022 here with presenting with chest pain and shortness of breath.  On arrival her troponins were negative, chest x-ray did not show any acute pulmonary process, CTA of the chest did not show any PE, CT of the abdomen showed 13 mm liver lesion and sclerotic lesions in the pelvis after which a CT liver was done which demonstrated that lesion was a hemangioma.  EKG did not show any acute ST/T wave ischemic changes.  She was cleared by infection prevention as her COVID returned positive.  Her stress test was negative. I discussed all findings with patient. She is concerned of her ongoing palpitations, I advised her to f/u with PCp for ziopatch.    Pro  during hospital stay     Pt reports feeling better taking aspirin

## 2022-08-26 ENCOUNTER — APPOINTMENT (OUTPATIENT)
Dept: RADIOLOGY | Facility: MEDICAL CENTER | Age: 62
End: 2022-08-26
Payer: COMMERCIAL

## 2022-09-14 ENCOUNTER — HOSPITAL ENCOUNTER (OUTPATIENT)
Dept: LAB | Facility: MEDICAL CENTER | Age: 62
End: 2022-09-14
Attending: NURSE PRACTITIONER
Payer: COMMERCIAL

## 2022-09-14 ENCOUNTER — TELEPHONE (OUTPATIENT)
Dept: MEDICAL GROUP | Facility: PHYSICIAN GROUP | Age: 62
End: 2022-09-14

## 2022-09-14 ENCOUNTER — OFFICE VISIT (OUTPATIENT)
Dept: MEDICAL GROUP | Facility: PHYSICIAN GROUP | Age: 62
End: 2022-09-14
Payer: COMMERCIAL

## 2022-09-14 VITALS
RESPIRATION RATE: 18 BRPM | HEIGHT: 68 IN | DIASTOLIC BLOOD PRESSURE: 88 MMHG | OXYGEN SATURATION: 98 % | BODY MASS INDEX: 22.31 KG/M2 | TEMPERATURE: 97.3 F | WEIGHT: 147.2 LBS | SYSTOLIC BLOOD PRESSURE: 132 MMHG | HEART RATE: 78 BPM

## 2022-09-14 DIAGNOSIS — R07.9 CHEST PAIN, UNSPECIFIED TYPE: ICD-10-CM

## 2022-09-14 DIAGNOSIS — Z88.9 HISTORY OF ALLERGY: ICD-10-CM

## 2022-09-14 DIAGNOSIS — I10 PRIMARY HYPERTENSION: ICD-10-CM

## 2022-09-14 DIAGNOSIS — E78.2 MIXED HYPERLIPIDEMIA: ICD-10-CM

## 2022-09-14 PROCEDURE — 82785 ASSAY OF IGE: CPT

## 2022-09-14 PROCEDURE — 36415 COLL VENOUS BLD VENIPUNCTURE: CPT

## 2022-09-14 PROCEDURE — 99214 OFFICE O/P EST MOD 30 MIN: CPT | Performed by: NURSE PRACTITIONER

## 2022-09-14 PROCEDURE — 86003 ALLG SPEC IGE CRUDE XTRC EA: CPT

## 2022-09-14 RX ORDER — PRAVASTATIN SODIUM 10 MG
10 TABLET ORAL NIGHTLY
Qty: 90 TABLET | Refills: 3 | Status: SHIPPED | OUTPATIENT
Start: 2022-09-14

## 2022-09-14 ASSESSMENT — FIBROSIS 4 INDEX: FIB4 SCORE: 0.71

## 2022-09-14 NOTE — ASSESSMENT & PLAN NOTE
At her most recent visit on 8/24 patient had been seen in the ER for palpitations and chest pain  I referred her to  cardio and ordered a Zio patch monitor  I prescribed nitroglycerin just in case     advised to measure her blood pressure at home bid  Pt messaged me between then and now letting me know that even with a half a tab of lisinopril her blood pressure was dropping below 100/60 I advised her to stop and bring her home recorded blood pressure values today    Has been gradually increasing  Was 80/60 now gradually increasing to >100.60  Feeling good

## 2022-09-14 NOTE — ASSESSMENT & PLAN NOTE
Most recent cholesterol panel showed mildly elevated triglycerides and LDL of 161 discussed with patient lifestyle changes to be made and adding pravastatin advised her to get her lipid panel rechecked in 3 months with her new primary care provider after she establishes

## 2022-09-14 NOTE — ASSESSMENT & PLAN NOTE
The 10-year ASCVD risk score (Ping CAMEJO Jr., et al., 2013) is: 6.3%    Values used to calculate the score:      Age: 62 years      Sex: Female      Is Non- : No      Diabetic: No      Tobacco smoker: No      Systolic Blood Pressure: 132 mmHg      Is BP treated: Yes      HDL Cholesterol: 64 mg/dL      Total Cholesterol: 261 mg/dL     Pt was referred to [facility] cardiology but the referral indicates d/t contract negotiations w/[employer] no new pts are being taken at this time  MA will call referral dept to see if we re route to someone else

## 2022-09-14 NOTE — TELEPHONE ENCOUNTER
I just saw the note from the referral department on the referral to cardiology that due to contract negotiations new patients were not being taken at this time do they need a new referral or can they just send it to someone other than [facility]?

## 2022-09-14 NOTE — ASSESSMENT & PLAN NOTE
Since moving here from [place] patient has had more allergy symptoms would like to be tested for food and local zone allergens

## 2022-09-14 NOTE — PROGRESS NOTES
"Subjective:     CC:   Chief Complaint   Patient presents with    Follow-Up     Labs        HPI:   Mariela presents today with    Primary hypertension  At her most recent visit on 8/24 patient had been seen in the ER for palpitations and chest pain  I referred her to  cardio and ordered a Zio patch monitor  I prescribed nitroglycerin just in case     advised to measure her blood pressure at home bid  Pt messaged me between then and now letting me know that even with a half a tab of lisinopril her blood pressure was dropping below 100/60 I advised her to stop and bring her home recorded blood pressure values today    Has been gradually increasing  Was 80/60 now gradually increasing to >100.60  Feeling good     Chest pain  The 10-year ASCVD risk score (Ping CAMEJO JrBernice, et al., 2013) is: 6.3%    Values used to calculate the score:      Age: 62 years      Sex: Female      Is Non- : No      Diabetic: No      Tobacco smoker: No      Systolic Blood Pressure: 132 mmHg      Is BP treated: Yes      HDL Cholesterol: 64 mg/dL      Total Cholesterol: 261 mg/dL     Pt was referred to [facility] cardiology but the referral indicates d/t contract negotiations w/[employer] no new pts are being taken at this time  MA will call referral dept to see if we re route to someone else       Mixed hyperlipidemia  Most recent cholesterol panel showed mildly elevated triglycerides and LDL of 161 discussed with patient lifestyle changes to be made and adding pravastatin advised her to get her lipid panel rechecked in 3 months with her new primary care provider after she establishes    History of allergy  Since moving here from [place] patient has had more allergy symptoms would like to be tested for food and local zone allergens              ROS per HPI    Objective:     Exam:  /88   Pulse 78   Temp 36.3 °C (97.3 °F) (Temporal)   Resp 18   Ht 1.727 m (5' 8\")   Wt 66.8 kg (147 lb 3.2 oz)   SpO2 98%   BMI 22.38 kg/m²  " Body mass index is 22.38 kg/m².    Physical Exam:  Constitutional: Well-developed and well-nourished female Not diaphoretic. No distress.   Skin: warm, dry, intact, no evidence of rash or concerning lesions  Head: Atraumatic without lesions.  Eyes: Conjunctivae are normal. Pupils are equal, round. No scleral icterus.   Ears:  External ears unremarkable.   Neck: Supple, trachea midline. No thyromegaly present. No cervical or supraclavicular lymphadenopathy.  Cardiovascular: Regular rate and rhythm without murmur.   Pulmonary: Clear to ausculation. Normal effort. No rales, ronchi, or wheezing.  Extremities: No cyanosis, clubbing, erythema, nor edema.   Neurological: Alert and oriented x 3.   Psychiatric:  Behavior, mood, and affect are appropriate.        Assessment & Plan:     62 y.o. female with the following -     1. Primary hypertension  Monitor and f/up w/cardio; sending new referral     2. Chest pain, unspecified type   No episodes; waiting on ziopatch monitor; need a new referral    Pt's insurance not accepted by [facility] anymore for in network; has appt w/[facility] care    Return in about 3 months (around 12/14/2022) for bp check, gen check in.    Please note that this dictation was created using voice recognition software. I have made every reasonable attempt to correct obvious errors, but I expect that there are errors of grammar and possibly content that I did not discover before finalizing the note.

## 2022-09-17 LAB
A ALTERNATA IGE QN: <0.1 KU/L
A FUMIGATUS IGE QN: <0.1 KU/L
ALMOND IGE QN: <0.1 KU/L
ASPARAGUS IGE QN: <0.1 KU/L
AVOCADO IGE QN: <0.1 KU/L
BAKER'S YEAST IGE QN: <0.1 KU/L
BANANA IGE QN: <0.1 KU/L
BASIL IGE QN: <0.1 KU/L
BAYLEAF IGE QN: <0.1 KU/L
BEEF IGE QN: <0.1 KU/L
BEET IGE QN: <0.1 KU/L
BELL PEPPER IGE QN: <0.1 KU/L
BERMUDA GRASS IGE QN: <0.1 KU/L
BLACK PEPPER IGE QN: <0.1 KU/L
BLUE MUSSEL IGE QN: <0.1 KU/L
BLUEBERRY IGE QN: <0.1 KU/L
BOXELDER IGE QN: <0.1 KU/L
BRAZIL NUT IGE QN: <0.1 KU/L
BROCCOLI IGE QN: <0.1 KU/L
BUCKWHEAT IGE QN: <0.1 KU/L
C SPHAEROSPERMUM IGE QN: <0.1 KU/L
CABBAGE IGE QN: <0.1 KU/L
CARROT IGE QN: <0.1 KU/L
CASHEW NUT IGE QN: <0.1 KU/L
CAT DANDER IGE QN: <0.1 KU/L
CHESTNUT IGE QN: <0.1 KU/L
CHICKPEA IGE AB [UNITS/VOLUME] IN SERUM: <0.1 KU/L
CHOCOLATE IGE QN: <0.1 KU/L
CINNAMON IGE QN: <0.1 KU/L
CLAM IGE QN: <0.1 KU/L
CMN PIGWEED IGE QN: <0.1 KU/L
COCONUT IGE QN: <0.1 KU/L
CODFISH IGE QN: <0.1 KU/L
COMMON RAGWEED IGE QN: <0.1 KU/L
COTTONWOOD IGE QN: <0.1 KU/L
COW MILK IGE QN: <0.1 KU/L
CRAB IGE QN: <0.1 KU/L
CUCUMBER IGE QN: <0.1 KU/L
CULTIVATED COTTON IGE QN: <0.1 KU/L
D FARINAE IGE QN: <0.1 KU/L
D PTERONYSS IGE QN: <0.1 KU/L
DEPRECATED MISC ALLERGEN IGE RAST QL: NORMAL
DILL IGE QN: <0.1 KU/L
DOG DANDER IGE QN: <0.1 KU/L
EGG WHITE IGE QN: <0.1 KU/L
GINGER IGE QN: <0.1 KU/L
GRAPE IGE QN: <0.1 KU/L
HALIBUT IGE QN: <0.1 KU/L
HAZELNUT IGE QN: <0.1 KU/L
IGE SERPL-ACNC: 8 KU/L
LETTUCE IGE QN: <0.1 KU/L
LIMA BEAN IGE QN: <0.1 KU/L
M RACEMOSUS IGE QN: <0.1 KU/L
MELON IGE QN: <0.1 KU/L
MOUSE EPITH IGE QN: <0.1 KU/L
MT JUNIPER IGE QN: <0.1 KU/L
MUGWORT IGE QN: <0.1 KU/L
OLIVE POLN IGE QN: <0.1 KU/L
ONION IGE QN: <0.1 KU/L
ORANGE IGE QN: <0.1 KU/L
OREGANO IGE QN: <0.1 KU/L
P NOTATUM IGE QN: <0.1 KU/L
PEA IGE QN: <0.1 KU/L
PEANUT IGE QN: <0.1 KU/L
PEAR IGE QN: <0.1 KU/L
PLUM IGE QN: <0.1 KU/L
PORK IGE QN: <0.1 KU/L
POTATO IGE QN: <0.1 KU/L
RASPBERRY IGE QN: <0.1 KU/L
ROACH IGE QN: <0.1 KU/L
SALTWORT IGE QN: <0.1 KU/L
SESAME SEED IGE QN: <0.1 KU/L
SHRIMP IGE QN: <0.1 KU/L
SOYBEAN IGE QN: <0.1 KU/L
TEA IGE QN: <0.1 KU/L
TIMOTHY IGE QN: <0.1 KU/L
TOMATO IGE QN: <0.1 KU/L
TROUT IGE QN: <0.1 KU/L
TURKEY MEAT IGE QN: <0.1 KU/L
WALNUT IGE QN: <0.1 KU/L
WATERMELON IGE QN: <0.1 KU/L
WHITE ELM IGE QN: <0.1 KU/L
WHITE MULBERRY IGE QN: <0.1 KU/L
WHITE OAK IGE QN: <0.1 KU/L

## 2024-06-10 ENCOUNTER — DOCUMENTATION (OUTPATIENT)
Dept: HEALTH INFORMATION MANAGEMENT | Facility: OTHER | Age: 64
End: 2024-06-10
Payer: OTHER MISCELLANEOUS